# Patient Record
Sex: MALE | Race: WHITE | NOT HISPANIC OR LATINO | Employment: FULL TIME | URBAN - METROPOLITAN AREA
[De-identification: names, ages, dates, MRNs, and addresses within clinical notes are randomized per-mention and may not be internally consistent; named-entity substitution may affect disease eponyms.]

---

## 2019-02-21 RX ORDER — FLUTICASONE PROPIONATE 50 MCG
2 SPRAY, SUSPENSION (ML) NASAL
COMMUNITY

## 2019-02-21 RX ORDER — FEXOFENADINE HCL 180 MG/1
180 TABLET ORAL
COMMUNITY
Start: 2012-05-25 | End: 2019-02-22

## 2019-02-21 RX ORDER — PANTOPRAZOLE SODIUM 40 MG/1
40 TABLET, DELAYED RELEASE ORAL
COMMUNITY
Start: 2016-08-02 | End: 2019-02-22

## 2019-02-21 RX ORDER — MONTELUKAST SODIUM 10 MG/1
10 TABLET ORAL
COMMUNITY
End: 2019-02-22

## 2019-02-22 ENCOUNTER — OFFICE VISIT (OUTPATIENT)
Dept: FAMILY MEDICINE CLINIC | Facility: CLINIC | Age: 29
End: 2019-02-22

## 2019-02-22 VITALS
TEMPERATURE: 98.9 F | HEIGHT: 72 IN | BODY MASS INDEX: 42.66 KG/M2 | RESPIRATION RATE: 18 BRPM | WEIGHT: 315 LBS | OXYGEN SATURATION: 97 % | DIASTOLIC BLOOD PRESSURE: 90 MMHG | SYSTOLIC BLOOD PRESSURE: 150 MMHG | HEART RATE: 82 BPM

## 2019-02-22 DIAGNOSIS — Z76.89 ENCOUNTER TO ESTABLISH CARE: Primary | ICD-10-CM

## 2019-02-22 DIAGNOSIS — Z28.21 REFUSED INFLUENZA VACCINE: ICD-10-CM

## 2019-02-22 DIAGNOSIS — I10 ESSENTIAL HYPERTENSION: ICD-10-CM

## 2019-02-22 DIAGNOSIS — E66.01 CLASS 3 SEVERE OBESITY DUE TO EXCESS CALORIES WITH SERIOUS COMORBIDITY AND BODY MASS INDEX (BMI) OF 50.0 TO 59.9 IN ADULT (HCC): ICD-10-CM

## 2019-02-22 DIAGNOSIS — R94.31 ABNORMAL ECG: ICD-10-CM

## 2019-02-22 DIAGNOSIS — Z72.0 TOBACCO ABUSE: ICD-10-CM

## 2019-02-22 PROBLEM — K51.90 ULCERATIVE COLITIS (HCC): Status: ACTIVE | Noted: 2019-02-22

## 2019-02-22 PROCEDURE — 99204 OFFICE O/P NEW MOD 45 MIN: CPT | Performed by: NURSE PRACTITIONER

## 2019-02-22 NOTE — PROGRESS NOTES
FAMILY PRACTICE OFFICE VISIT       NAME: Izabella Perry  AGE: 33 y o  SEX: male       : 1990        MRN: [id number]    DATE: 2019    Assessment and Plan     Problem List Items Addressed This Visit        Other    Class 3 severe obesity due to excess calories with body mass index (BMI) of 50 0 to 59 9 in adult [facility])    Tobacco abuse      Other Visit Diagnoses     Encounter to establish care    -  Primary    Essential hypertension        Abnormal ECG        Relevant Orders    Ambulatory referral to Cardiology    Refused influenza vaccine        Relevant Orders    PREFERRED: influenza vaccine, [id number], quadrivalent, recombinant, PF, 0 5 mL (FLUBLOK)          1  Encounter to establish care  This 33year old male presents today to establish care  He is transitioning care from [facility] in order to be closer to home  Records in Epic reviewed  2  Essential hypertension  BP elevated 170/90, 150/90 on recheck  Records reveal he was taking HCTZ at one time  I would like him to return when he has insurance, over the next 1 month for BP check  Work on lifestyle modification, smoking cessation and weight loss  3  Abnormal ECG  He has been referred to our office by [facility] after completing work physical  His resting HR at physical was 120  In office today, initial HR check was 112  After sitting for a while, resting HR 82  In office ECG, with NSR, HR 90, normal axis, with nonspecific T wave changes  No acute ischemia  No comparison available  Based on abnormal ECG, and risk factors, morbid obesity, smoking, hypertension, recommend follow up with cardiology for cardiac clearance for employment  Name and number provided today  Reviewed plan of care and ECG with Dr Johnson Chaney  Ambulatory referral to Cardiology   4   Class 3 severe obesity due to excess calories with serious comorbidity and body mass index (BMI) of 50 0 to 59 9 in adult ([facility])  BMI Counseling: Body mass index is 55 29 kg/m²  Discussed the patient's BMI with him  The BMI is above average  BMI counseling and education was provided to the patient  Nutrition recommendations include reducing portion sizes, consuming healthier snacks, decreasing soda and/or juice intake, moderation in carbohydrate intake and increasing intake of lean protein  Exercise recommendations include exercising 3-5 times per week  Begin slowly with walking  5  Tobacco Abuse Tobacco Cessation Counseling: Tobacco cessation counseling and education was provided  The patient is sincerely urged to quit consumption of tobacco  He is not ready to quit tobacco  The numerous health risks of tobacco consumption were discussed  If he decides to quit, there are  anumber of helpful adjunctive aids, and he can see me to discuss nicotine replacement therapy, chantix, or bupropion anytime in the future  6  Refused influenza vaccine  PREFERRED: influenza vaccine, [id number], quadrivalent, recombinant, PF, 0 5 mL (FLUBLOK)           Chief Complaint     Chief Complaint   Patient presents with    Well Check     NP is here to est care  Check heart rate       History of Present Illness     Jumana Campbell is a 33year old male presenting today to establish care and for high heart rate  Prior PCP [address] [place] [address], last visit about 2 years ago  He has moved to this area in August and wants to establish care close to home  He is currently working as a  (night shift) in a temporary position  He has no health insurance in this position  He was offered a full time position  In order to get this position he is required to pass a physical exam for [employer]  At his physical exam, he was tachycardic, with resting   He was directed to follow up with PCP  He has a form with him today to release him to work  He reports his heart rate was checked after completing agility testing  History of ulcerative colitis   He does not follow up with gastroenterology  Has not had a flare up since 2008  Had pancreatitis once secondary to alcohol intake  States he rarely drinks any alcohol since then  Is fearful of alcohol consumption  Currently smoking  Had quit successfully cold turkey for about 3 years  Restarted with stressful events  Not ready to quit again  Family members tried Chantix and did not tolerate  He is reluctant to try Chantix or Zyban  Seasonal allergies uses Allegra and Flonase as needed  Does not have regular dental exams, is planning to go to the dentist when he gets insurance  Review of Systems   Review of Systems   Constitutional: Negative  HENT: Positive for hearing loss (left ear partially deaf since childhood)  Negative for congestion, dental problem, ear pain, postnasal drip, rhinorrhea, sinus pressure, sore throat, tinnitus, trouble swallowing and voice change  Eyes: Positive for visual disturbance (wears glasses)  Negative for photophobia, pain, discharge, redness and itching  Respiratory: Negative for cough, chest tightness, shortness of breath and wheezing  Cardiovascular: Negative for chest pain, palpitations and leg swelling  Gastrointestinal: Negative for abdominal pain, constipation, diarrhea, nausea and vomiting  Endocrine: Negative  Genitourinary: Negative  Musculoskeletal: Negative  Skin: Negative  Neurological: Negative for dizziness, seizures, syncope, light-headedness and headaches  Hematological: Does not bruise/bleed easily  Psychiatric/Behavioral: Negative  Active Problem List     Patient Active Problem List   Diagnosis    Ulcerative colitis ([facility] [address] )    Class 3 severe obesity due to excess calories with body mass index (BMI) of 50 0 to 59 9 in adult [facility])    Hearing loss in left ear    Tobacco abuse       Past Medical History:  No past medical history on file      Past Surgical History:  Past Surgical History:   Procedure Laterality Date    [facility]  2008    [facility]    MOUTH SURGERY         Family History:  Family History   Problem Relation Age of Onset    Hypertension Father     Diabetes Father     Meniere's disease Maternal Grandfather        Social History:  Social History     Socioeconomic History    Marital status: Single     Spouse name: Not on file    Number of children: Not on file    Years of education: Not on file    Highest education level: Not on file   Occupational History    Not on file   Social Needs    Financial resource strain: Not on file    Food insecurity:     Worry: Not on file     Inability: Not on file    Transportation needs:     Medical: Not on file     Non-medical: Not on file   Tobacco Use    Smoking status: Current Every Day Smoker     Packs/day: 0 50    Smokeless tobacco: Never Used   Substance and Sexual Activity    Alcohol use: Yes     Comment: occasionally    Drug use: Never    Sexual activity: Not on file   Lifestyle    Physical activity:     Days per week: Not on file     Minutes per session: Not on file    Stress: Not on file   Relationships    Social connections:     Talks on phone: Not on file     Gets together: Not on file     Attends Religion service: Not on file     Active member of club or organization: Not on file     Attends meetings of clubs or organizations: Not on file     Relationship status: Not on file    Intimate partner violence:     Fear of current or ex partner: Not on file     Emotionally abused: Not on file     Physically abused: Not on file     Forced sexual activity: Not on file   Other Topics Concern    Not on file   Social History Narrative    Not on file       I have reviewed the patient's medical history in detail; there are no changes to the history as noted in the electronic medical record      Objective     Vitals:    02/22/19 0728 02/22/19 0815   BP: 170/90 150/90   Pulse: (!) 112 82   Resp: 18    Temp: 98 9 °F (37 2 °C)    TempSrc: Tympanic SpO2:  97%   Weight: (!) 182 kg (402 lb)    Height: 5' 11 5" (1 816 m)      Wt Readings from Last 3 Encounters:   02/22/19 (!) 182 kg (402 lb)     Body mass index is 55 29 kg/m²  PHQ-9 Depression Screening    PHQ-9:    Frequency of the following problems over the past two weeks:       Little interest or pleasure in doing things:  0 - not at all  Feeling down, depressed, or hopeless:  0 - not at all  PHQ-2 Score:  0       Physical Exam   Constitutional: He is oriented to person, place, and time  He appears well-developed and well-nourished  No distress  HENT:   Head: Normocephalic and atraumatic  Right Ear: Tympanic membrane and ear canal normal    Left Ear: Tympanic membrane and ear canal normal    Nose: Nose normal    Mouth/Throat: Oropharynx is clear and moist    Eyes: Pupils are equal, round, and reactive to light  Conjunctivae and EOM are normal    Neck: Normal range of motion  Neck supple  No thyromegaly present  Cardiovascular: Normal rate, regular rhythm and normal heart sounds  Pulmonary/Chest: Effort normal and breath sounds normal  No respiratory distress  Abdominal: Soft  Bowel sounds are normal  There is no tenderness  obese   Musculoskeletal: Normal range of motion  He exhibits no edema  Lymphadenopathy:     He has no cervical adenopathy  Neurological: He is alert and oriented to person, place, and time  Skin: Skin is warm and dry  No rash noted  Psychiatric: He has a normal mood and affect  Nursing note and vitals reviewed  ALLERGIES:  Allergies   Allergen Reactions    Morphine Anaphylaxis     Anaphylaxis    Guaifenesin Hives    Penicillins Rash    Sulfa Antibiotics Rash       Current Medications     Current Outpatient Medications   Medication Sig Dispense Refill    fexofenadine (ALLEGRA) 180 MG tablet Take by mouth      fluticasone (FLONASE) 50 mcg/act nasal spray 2 sprays into each nostril       No current facility-administered medications for this visit  Health Maintenance     Health Maintenance   Topic Date Due    BMI: Followup Plan  12/24/2009    Pneumococcal PPSV23 Medium Risk Adult (1 of 1 - PPSV23) 12/24/2010    DTaP,Tdap,and Td Vaccines (1 - Tdap) 12/24/2012    INFLUENZA VACCINE  02/22/2020 (Originally 7/1/2018)    Depression Screening PHQ  02/22/2020    BMI: Adult  02/22/2020    HEPATITIS B VACCINES  Aged Out     Immunization History   Administered Date(s) Administered    INFLUENZA 02/04/2013       TONIE [place]

## 2019-02-24 PROBLEM — Z72.0 TOBACCO ABUSE: Status: ACTIVE | Noted: 2019-02-24

## 2019-02-24 PROBLEM — H91.92 HEARING LOSS IN LEFT EAR: Status: ACTIVE | Noted: 2019-02-24

## 2019-03-07 ENCOUNTER — HOSPITAL ENCOUNTER (OUTPATIENT)
Dept: NON INVASIVE DIAGNOSTICS | Facility: CLINIC | Age: 29
Discharge: HOME | End: 2019-03-07

## 2019-03-07 ENCOUNTER — TELEPHONE (OUTPATIENT)
Dept: CARDIOLOGY CLINIC | Facility: CLINIC | Age: 29
End: 2019-03-07

## 2019-03-07 ENCOUNTER — CONSULT (OUTPATIENT)
Dept: CARDIOLOGY CLINIC | Facility: CLINIC | Age: 29
End: 2019-03-07

## 2019-03-07 VITALS
WEIGHT: 315 LBS | DIASTOLIC BLOOD PRESSURE: 90 MMHG | BODY MASS INDEX: 44.1 KG/M2 | SYSTOLIC BLOOD PRESSURE: 166 MMHG | HEART RATE: 90 BPM | HEIGHT: 71 IN

## 2019-03-07 DIAGNOSIS — R94.31 ABNORMAL ECG: ICD-10-CM

## 2019-03-07 DIAGNOSIS — Z72.0 TOBACCO ABUSE: ICD-10-CM

## 2019-03-07 DIAGNOSIS — Z72.0 TOBACCO ABUSE: Primary | ICD-10-CM

## 2019-03-07 DIAGNOSIS — I10 ESSENTIAL (PRIMARY) HYPERTENSION: ICD-10-CM

## 2019-03-07 DIAGNOSIS — R94.31 ABNORMAL EKG: ICD-10-CM

## 2019-03-07 PROCEDURE — 93000 ELECTROCARDIOGRAM COMPLETE: CPT | Performed by: INTERNAL MEDICINE

## 2019-03-07 PROCEDURE — 99244 OFF/OP CNSLTJ NEW/EST MOD 40: CPT | Performed by: INTERNAL MEDICINE

## 2019-03-07 PROCEDURE — 93306 TTE W/DOPPLER COMPLETE: CPT | Performed by: INTERNAL MEDICINE

## 2019-03-07 PROCEDURE — 93306 TTE W/DOPPLER COMPLETE: CPT

## 2019-03-07 RX ORDER — LOSARTAN POTASSIUM 100 MG/1
100 TABLET ORAL DAILY
Qty: 90 TABLET | Refills: 3 | Status: SHIPPED | OUTPATIENT
Start: 2019-03-07 | End: 2019-06-17 | Stop reason: ALTCHOICE

## 2019-03-07 NOTE — TELEPHONE ENCOUNTER
Echo shows good heart function  He has mild LVH but I started him today on blood pressure medication  I sent a note to Alek Wyatt and you can check with her about this  He is cleared to go to work tomorrow  If he needs a note just forwarded to me and I will sign it  Thank you

## 2019-03-07 NOTE — PATIENT INSTRUCTIONS
I will start losartan 100 mg per day  Have your blood pressure checked in about 2-3 weeks and call in the numbers to me  I will arrange an echocardiogram to check your heart function  Once I have this information I will hopefully be able to clear you for your employment  Call in the interim if there is a problem otherwise I will see you in six months time

## 2019-03-07 NOTE — PROGRESS NOTES
Cardiology Consultation     Kaleb Beyer  [id number]  1990  [facility] [address]  1  Tobacco abuse     2  Essential (primary) hypertension     3  Abnormal EKG  POCT ECG   4  Abnormal ECG  Ambulatory referral to Cardiology     Patient Active Problem List   Diagnosis    Ulcerative colitis ([facility] [address] )    Class 3 severe obesity due to excess calories with body mass index (BMI) of 50 0 to 59 9 in adult [facility])    Hearing loss in left ear    Tobacco abuse       HPI patient is here for a cardiology evaluation  He has a history of morbid obesity  He apparently had had an electrocardiogram done which was felt to be abnormal and is referred here for an assessment  He needs clearance to start a new job  Patient does have essential hypertension  He is well smoke cigarettes  He has cut back but still smokes 10 cigarettes per day  He was having a physical prior to getting his job as a  and he developed an increased heart rate  Had an EKG done in his primary care physician's office which was felt to be nonspecifically abnormal and is here today for evaluation  His EKG today as well demonstrates sinus rhythm with nonspecific T-wave changes  An EKG done at [facility] in 2016 as well demonstrated nonspecific T-wave changes  He has had no chest pain or significant dyspnea  He is here for a cardiac evaluation prior to returning to work as a   He tells me that he has a family history of heart disease in reference to his father who has hypertension and a heart murmur  Apparently his paternal grandmother as well has a heart murmur  PMH-  No past medical history on file       SOCIAL HISTORY-  Social History     Socioeconomic History    Marital status: Single     Spouse name: Not on file    Number of children: Not on file    Years of education: Not on file    Highest education level: Not on file   Occupational History    Not on file   Social Needs    Financial resource strain: Not on file    Food insecurity:     Worry: Not on file     Inability: Not on file    Transportation needs:     Medical: Not on file     Non-medical: Not on file   Tobacco Use    Smoking status: Current Every Day Smoker     Packs/day: 0 50    Smokeless tobacco: Never Used   Substance and Sexual Activity    Alcohol use: Yes     Comment: occasionally    Drug use: Never    Sexual activity: Not on file   Lifestyle    Physical activity:     Days per week: Not on file     Minutes per session: Not on file    Stress: Not on file   Relationships    Social connections:     Talks on phone: Not on file     Gets together: Not on file     Attends Druze service: Not on file     Active member of club or organization: Not on file     Attends meetings of clubs or organizations: Not on file     Relationship status: Not on file    Intimate partner violence:     Fear of current or ex partner: Not on file     Emotionally abused: Not on file     Physically abused: Not on file     Forced sexual activity: Not on file   Other Topics Concern    Not on file   Social History Narrative    Not on file        FAMILY HISTORY-  Family History   Problem Relation Age of Onset    Hypertension Father     Diabetes Father     Meniere's disease Maternal Grandfather        SURGICAL HISTORY-  Past Surgical History:   Procedure Laterality Date    COLONOSCOPY  2008    [facility]    MOUTH SURGERY           Current Outpatient Medications:     fexofenadine (ALLEGRA) 180 MG tablet, Take by mouth, Disp: , Rfl:     fluticasone (FLONASE) 50 mcg/act nasal spray, 2 sprays into each nostril, Disp: , Rfl:   Allergies   Allergen Reactions    Morphine Anaphylaxis     Anaphylaxis    Guaifenesin Hives    Penicillins Rash    Sulfa Antibiotics Rash     Vitals:    03/07/19 1024   BP: 166/90   BP Location: Right arm Patient Position: Sitting   Cuff Size: Extra-Large   Pulse: 90   Weight: (!) 182 kg (401 lb)   Height: 5' 11" (1 803 m)         Review of Systems:  Review of Systems   All other systems reviewed and are negative  Physical Exam:  Physical Exam   Constitutional: He is oriented to person, place, and time  He appears well-developed and well-nourished  HENT:   Head: Normocephalic and atraumatic  Eyes: Pupils are equal, round, and reactive to light  Conjunctivae are normal    Neck: Normal range of motion  Neck supple  Cardiovascular: Normal rate and normal heart sounds  Pulmonary/Chest: Effort normal and breath sounds normal    Neurological: He is alert and oriented to person, place, and time  Skin: Skin is warm and dry  Psychiatric: He has a normal mood and affect  Vitals reviewed  Discussion/Summary:  I will start losartan 100 mg per day for his blood pressure  We will recheck his blood pressure in about two weeks time  I will check an echocardiogram to assess LV wall thickness and systolic function  I did tell him that once I obtain the result of the echocardiogram that I would clear him to return to work  I have asked him to call in general if there is a problem in the interim otherwise I will see him in six months time  We did discuss the importance of tobacco cessation  Is slowly cutting down on the number of cigarettes that he uses

## 2019-06-17 ENCOUNTER — TELEPHONE (OUTPATIENT)
Dept: CARDIOLOGY CLINIC | Facility: CLINIC | Age: 29
End: 2019-06-17

## 2019-06-17 DIAGNOSIS — I10 ESSENTIAL (PRIMARY) HYPERTENSION: Primary | ICD-10-CM

## 2019-06-17 RX ORDER — AMLODIPINE BESYLATE 5 MG/1
5 TABLET ORAL DAILY
Qty: 30 TABLET | Refills: 6 | Status: SHIPPED | OUTPATIENT
Start: 2019-06-17 | End: 2019-07-05 | Stop reason: ALTCHOICE

## 2019-07-05 ENCOUNTER — OFFICE VISIT (OUTPATIENT)
Dept: FAMILY MEDICINE CLINIC | Facility: CLINIC | Age: 29
End: 2019-07-05
Payer: COMMERCIAL

## 2019-07-05 VITALS
TEMPERATURE: 100.1 F | HEIGHT: 71 IN | BODY MASS INDEX: 44.1 KG/M2 | OXYGEN SATURATION: 96 % | WEIGHT: 315 LBS | HEART RATE: 100 BPM | DIASTOLIC BLOOD PRESSURE: 90 MMHG | SYSTOLIC BLOOD PRESSURE: 142 MMHG | RESPIRATION RATE: 20 BRPM

## 2019-07-05 DIAGNOSIS — I10 HYPERTENSION, UNSPECIFIED TYPE: ICD-10-CM

## 2019-07-05 DIAGNOSIS — Z00.00 WELL ADULT EXAM: Primary | ICD-10-CM

## 2019-07-05 DIAGNOSIS — E11.9 NEW ONSET TYPE 2 DIABETES MELLITUS (HCC): ICD-10-CM

## 2019-07-05 DIAGNOSIS — E66.01 CLASS 3 SEVERE OBESITY DUE TO EXCESS CALORIES WITH SERIOUS COMORBIDITY AND BODY MASS INDEX (BMI) OF 50.0 TO 59.9 IN ADULT (HCC): ICD-10-CM

## 2019-07-05 LAB
SL AMB  POCT GLUCOSE, UA: ABNORMAL
SL AMB LEUKOCYTE ESTERASE,UA: ABNORMAL
SL AMB POCT BILIRUBIN,UA: ABNORMAL
SL AMB POCT BLOOD,UA: ABNORMAL
SL AMB POCT CLARITY,UA: CLEAR
SL AMB POCT COLOR,UA: YELLOW
SL AMB POCT KETONES,UA: ABNORMAL
SL AMB POCT NITRITE,UA: ABNORMAL
SL AMB POCT PH,UA: 5
SL AMB POCT SPECIFIC GRAVITY,UA: 1.02
SL AMB POCT URINE PROTEIN: ABNORMAL
SL AMB POCT UROBILINOGEN: 0.2

## 2019-07-05 PROCEDURE — 81003 URINALYSIS AUTO W/O SCOPE: CPT | Performed by: NURSE PRACTITIONER

## 2019-07-05 PROCEDURE — 99395 PREV VISIT EST AGE 18-39: CPT | Performed by: NURSE PRACTITIONER

## 2019-07-05 RX ORDER — AMLODIPINE BESYLATE 10 MG/1
10 TABLET ORAL DAILY
Qty: 90 TABLET | Refills: 1 | Status: SHIPPED | OUTPATIENT
Start: 2019-07-05 | End: 2020-10-08

## 2019-07-05 NOTE — PROGRESS NOTES
FAMILY PRACTICE OFFICE VISIT       NAME: Sarah Perry  AGE: 33 y o  SEX: male       : 1990        MRN: [id number]    DATE: 2019    Assessment and Plan     Problem List Items Addressed This Visit        Other    Class 3 severe obesity due to excess calories with body mass index (BMI) of 50 0 to 59 9 in adult [facility])      Other Visit Diagnoses     Well adult exam    -  Primary    Relevant Orders    POCT urine dip auto non-scope (Completed)    New onset type 2 diabetes mellitus ([facility] [address] )        Relevant Medications    metFORMIN (GLUCOPHAGE) 500 mg tablet    Other Relevant Orders    Ambulatory referral to medical nutrition therapy for diabetes    Ambulatory referral to Endocrinology    Ambulatory referral to Ophthalmology    Hypertension, unspecified type        Relevant Medications    amLODIPine (NORVASC) 10 mg tablet        1  Well adult exam  In office urine dip is positive for glucosuria  POCT hemoglobin A1c checked, 12%  New diagnosis of diabetes today  Suspect type 2 diabetes due to lifestyle  POCT urine dip auto non-scope   2  New onset type 2 diabetes mellitus ([facility] [address] )  Will begin Metformin 500 mg BID for 1 week, then increase to 1000 mg in the am, and 500 mg in the pm for 1 week, then increase to 1000 mg twice daily  Reviewed side effect profile of metformin  He does have a history of pancreatitis, which affects medication choices  Discussed impact of lifestyle choices on disease process  Recommend eating diabetic diet, exercising, and weight loss  Will refer to endocrinology and medical nutrition therapy  Recommend regular exercise  Had an eye exam 2-3 months ago  Discussed he will need another eye exam specific for diabetes, checking for retinopathy  Reviewed risks of uncontrolled diabetes including retinopathy, nephropathy, neuropathy, heart attack, and stroke  Reviewed importance of daily visual foot exams and taking good care of feet  Return to office for follow up in 1 month   Will do foot exam and urine for microalbumin at this visit  Recommend follow up with endocrinology  Ambulatory referral to medical nutrition therapy for diabetes    Ambulatory referral to Endocrinology    Ambulatory referral to Ophthalmology    metFORMIN (GLUCOPHAGE) 500 mg tablet   3  Hypertension, unspecified type  BP elevated in office today, will increase amlodipine to 10 mg daily  Goal <130/80  Will follow up in 1 month  amLODIPine (NORVASC) 10 mg tablet   4  Class 3 severe obesity due to excess calories with serious comorbidity and body mass index (BMI) of 50 0 to 59 9 in adult ([facility])  BMI Counseling: Body mass index is 53 39 kg/m²  Discussed the patient's BMI with him  The BMI is above average  BMI counseling and education was provided to the patient  Nutrition recommendations include reducing portion sizes, decreasing overall calorie intake, 3-5 servings of fruits/vegetables daily, decreasing soda and/or juice intake, moderation in carbohydrate intake and increasing intake of lean protein  Exercise recommendations include moderate aerobic physical activity for 150 minutes/week  Congratulated on recent weight loss of 20 pounds  Chief Complaint     Chief Complaint   Patient presents with    Well Check       History of Present Illness     Bob Rice is a 33year old male presenting today for physical exam     He feels thirsty and has been voiding frequently recently  Otherwise feels well  Has lost approximately 20 lb over the past 5 months her dietary changes  Cut back on soda  Stops drinking whole milk unchanged disc given milk  Cut down on portion sizes  Trying to make better choices  He has not been exercising except for walking a lot at work  Review of Systems   Review of Systems   Constitutional: Negative for activity change, appetite change, chills, diaphoresis, fatigue, fever and unexpected weight change  HENT: Negative  Eyes: Negative  Respiratory: Negative  Cardiovascular: Negative  Gastrointestinal: Negative  Endocrine: Positive for polydipsia and polyuria  Genitourinary: Negative  Musculoskeletal: Negative  Skin: Negative  Neurological: Negative  Hematological: Negative  Psychiatric/Behavioral: Negative          Active Problem List     Patient Active Problem List   Diagnosis    Ulcerative colitis ([facility] [address] )    Class 3 severe obesity due to excess calories with body mass index (BMI) of 50 0 to 59 9 in adult [facility])    Hearing loss in left ear    Tobacco abuse       Past Medical History:  Past Medical History:   Diagnosis Date    Blood in stool     Cough with sputum     Problems with hearing     Wheezing        Past Surgical History:  Past Surgical History:   Procedure Laterality Date    COLONOSCOPY  2008    [facility]    MOUTH SURGERY         Family History:  Family History   Problem Relation Age of Onset    Hypertension Father     Diabetes Father     Meniere's disease Maternal Grandfather        Social History:  Social History     Socioeconomic History    Marital status: Single     Spouse name: Not on file    Number of children: Not on file    Years of education: Not on file    Highest education level: Not on file   Occupational History    Not on file   Social Needs    Financial resource strain: Not on file    Food insecurity:     Worry: Not on file     Inability: Not on file    Transportation needs:     Medical: Not on file     Non-medical: Not on file   Tobacco Use    Smoking status: Current Every Day Smoker     Packs/day: 0 50    Smokeless tobacco: Never Used   Substance and Sexual Activity    Alcohol use: Yes     Comment: occasionally    Drug use: Never    Sexual activity: Not on file   Lifestyle    Physical activity:     Days per week: Not on file     Minutes per session: Not on file    Stress: Not on file   Relationships    Social connections:     Talks on phone: Not on file     Gets together: Not on file     Attends Catholic service: Not on file     Active member of club or organization: Not on file     Attends meetings of clubs or organizations: Not on file     Relationship status: Not on file    Intimate partner violence:     Fear of current or ex partner: Not on file     Emotionally abused: Not on file     Physically abused: Not on file     Forced sexual activity: Not on file   Other Topics Concern    Not on file   Social History Narrative    Not on file       I have reviewed the patient's medical history in detail; there are no changes to the history as noted in the electronic medical record  Objective     Vitals:    07/05/19 0731   BP: 142/90   BP Location: Left arm   Patient Position: Sitting   Cuff Size: Large   Pulse: 100   Resp: 20   Temp: 100 1 °F (37 8 °C)   TempSrc: Tympanic   SpO2: 96%   Weight: (!) 174 kg (382 lb 12 8 oz)   Height: 5' 11" (1 803 m)     Wt Readings from Last 3 Encounters:   07/05/19 (!) 174 kg (382 lb 12 8 oz)   03/07/19 (!) 182 kg (401 lb)   02/22/19 (!) 182 kg (402 lb)     Body mass index is 53 39 kg/m²  PHQ-9 Depression Screening    PHQ-9:    Frequency of the following problems over the past two weeks:            Physical Exam   Constitutional: He is oriented to person, place, and time  He appears well-developed and well-nourished  No distress  HENT:   Head: Normocephalic and atraumatic  Right Ear: Tympanic membrane and ear canal normal    Left Ear: Tympanic membrane and ear canal normal    Nose: Nose normal    Mouth/Throat: Oropharynx is clear and moist    Eyes: Pupils are equal, round, and reactive to light  Conjunctivae and EOM are normal    Neck: Normal range of motion  Neck supple  No thyromegaly present  Cardiovascular: Normal rate and regular rhythm  No murmur heard  Pulmonary/Chest: Effort normal and breath sounds normal    Abdominal: Soft  Bowel sounds are normal    Abdomen obese   Musculoskeletal: Normal range of motion  He exhibits no edema  Lymphadenopathy:     He has no cervical adenopathy  Neurological: He is alert and oriented to person, place, and time  Skin: No rash noted  Psychiatric: He has a normal mood and affect  Nursing note and vitals reviewed  ALLERGIES:  Allergies   Allergen Reactions    Morphine Anaphylaxis     Anaphylaxis    Guaifenesin Hives    Losartan Abdominal Pain     Muscle cramps    Penicillins Rash    Sulfa Antibiotics Rash       Current Medications     Current Outpatient Medications   Medication Sig Dispense Refill    fexofenadine (ALLEGRA) 180 MG tablet Take 180 mg by mouth daily       fluticasone (FLONASE) 50 mcg/act nasal spray 2 sprays into each nostril      amLODIPine (NORVASC) 10 mg tablet Take 1 tablet (10 mg total) by mouth daily 90 tablet 1    metFORMIN (GLUCOPHAGE) 500 mg tablet Take 2 tablets (1,000 mg total) by mouth 2 (two) times a day with meals 120 tablet 0     No current facility-administered medications for this visit            Health Maintenance     Health Maintenance   Topic Date Due    Pneumococcal Vaccine: Pediatrics (0 to 5 Years) and At-Risk Patients (6 to 59 Years) (1 of 1 - PPSV23) 12/24/1997    HEPATITIS B VACCINES (1 of 3 - Risk 3-dose series) 12/24/2010    DTaP,Tdap,and Td Vaccines (1 - Tdap) 12/24/2012    INFLUENZA VACCINE  02/22/2020 (Originally 7/1/2019)    Depression Screening PHQ  02/22/2020    BMI: Followup Plan  02/24/2020    BMI: Adult  07/05/2020    Pneumococcal Vaccine: 65+ Years (1 of 2 - PCV13) 12/24/2056     Immunization History   Administered Date(s) Administered    INFLUENZA 02/04/2013       TONIE Hackett

## 2019-08-05 ENCOUNTER — OFFICE VISIT (OUTPATIENT)
Dept: FAMILY MEDICINE CLINIC | Facility: CLINIC | Age: 29
End: 2019-08-05
Payer: COMMERCIAL

## 2019-08-05 VITALS
HEIGHT: 71 IN | TEMPERATURE: 99.7 F | SYSTOLIC BLOOD PRESSURE: 130 MMHG | RESPIRATION RATE: 18 BRPM | OXYGEN SATURATION: 98 % | WEIGHT: 315 LBS | BODY MASS INDEX: 44.1 KG/M2 | HEART RATE: 102 BPM | DIASTOLIC BLOOD PRESSURE: 98 MMHG

## 2019-08-05 DIAGNOSIS — E66.01 CLASS 3 SEVERE OBESITY DUE TO EXCESS CALORIES WITH SERIOUS COMORBIDITY AND BODY MASS INDEX (BMI) OF 50.0 TO 59.9 IN ADULT (HCC): ICD-10-CM

## 2019-08-05 DIAGNOSIS — Z72.0 TOBACCO ABUSE: ICD-10-CM

## 2019-08-05 DIAGNOSIS — I10 ESSENTIAL HYPERTENSION: ICD-10-CM

## 2019-08-05 DIAGNOSIS — E11.65 UNCONTROLLED TYPE 2 DIABETES MELLITUS WITH HYPERGLYCEMIA (HCC): Primary | ICD-10-CM

## 2019-08-05 LAB — SL AMB POCT HEMOGLOBIN AIC: 8.8 (ref ?–6.5)

## 2019-08-05 PROCEDURE — 3008F BODY MASS INDEX DOCD: CPT | Performed by: NURSE PRACTITIONER

## 2019-08-05 PROCEDURE — 82043 UR ALBUMIN QUANTITATIVE: CPT | Performed by: NURSE PRACTITIONER

## 2019-08-05 PROCEDURE — 82570 ASSAY OF URINE CREATININE: CPT | Performed by: NURSE PRACTITIONER

## 2019-08-05 PROCEDURE — 83036 HEMOGLOBIN GLYCOSYLATED A1C: CPT | Performed by: NURSE PRACTITIONER

## 2019-08-05 PROCEDURE — 99214 OFFICE O/P EST MOD 30 MIN: CPT | Performed by: NURSE PRACTITIONER

## 2019-08-05 PROCEDURE — 3045F PR MOST RECENT HEMOGLOBIN A1C LEVEL 7.0-9.0%: CPT | Performed by: NURSE PRACTITIONER

## 2019-08-05 NOTE — PROGRESS NOTES
FAMILY PRACTICE OFFICE VISIT       NAME: Kelvin Perry  AGE: 33 y o  SEX: male       : 1990        MRN: [id number]    DATE: 2019    Assessment and Plan     Problem List Items Addressed This Visit        Endocrine    Uncontrolled type 2 diabetes mellitus with hyperglycemia ([address] ) - Primary    Relevant Medications    metFORMIN (GLUCOPHAGE) 500 mg tablet    Other Relevant Orders    Glucometer    Glucometer test strips    Lancets    Microalbumin / creatinine urine ratio (Completed)    POCT hemoglobin A1c (Completed)       Cardiovascular and Mediastinum    Essential hypertension    Relevant Medications    metFORMIN (GLUCOPHAGE) 500 mg tablet    Other Relevant Orders    Microalbumin / creatinine urine ratio (Completed)    POCT hemoglobin A1c (Completed)       Other    Class 3 severe obesity due to excess calories with body mass index (BMI) of 50 0 to 59 9 in adult [facility])    Tobacco abuse        1  Uncontrolled type 2 diabetes mellitus with hyperglycemia [facility])  This 42-year-old male presents today for follow-up on uncontrolled diabetes  A1c at last office visit was 12% on   Over the past 1 month he has made dietary changes, is continuing to work on weight loss  He is taking metformin 1000 mg twice daily  Reached max dose 2 weeks ago  Today A1c is down to 8 8%  This is significant improvement, patient is congratulated on his efforts  He understands although improved, A1c is not yet at goal   I am hesitant to add another medication at this point due to significant improvement in 1 month with metformin and lifestyle changes  He also has a history of pancreatitis, which impacts options for further agents  He will attend diabetic nutrition class, will call to schedule today  He will be seeing Endocrinology in September, Dr Estefani Winston  Will continue with lifestyle modifications  I would like him to check his fasting blood glucose daily    He works night shift, so most of the time this will be an afternoon glucose  He will record blood glucoses, and bring them to his endocrinology appointment in September  Will check urine for microalbumin today  He was on losartan therapy in the past and developed significant muscle cramps  May consider adding low-dose lisinopril in the future for renal protection  Aware he needs eye exam for diabetic retinopathy check  Foot exam was completed today  He will complete previously ordered blood work including CBC, CMP, TSH and lipid panel  He has a strong family history of diabetes, and is aware of long-term risks of uncontrolled blood glucoses  Glucometer    Glucometer test strips    Lancets    Microalbumin / creatinine urine ratio    POCT hemoglobin A1c   2  Class 3 severe obesity due to excess calories with serious comorbidity and body mass index (BMI) of 50 0 to 59 9 in [facility])  Actively working on weight loss  Lost 5 lb in the past 1 month, and has lost 35 lb over the past 6 months  He has done most of this through dietary changes  Continue to encourage increasing activity level, and regular exercise  3  Tobacco abuse  Tobacco Cessation Counseling: Tobacco cessation counseling and education was provided  The patient is sincerely urged to quit consumption of tobacco  He is not ready to quit tobacco  The numerous health risks of tobacco consumption were discussed  If he decides to quit, there are  anumber of helpful adjunctive aids, and he can see me to discuss nicotine replacement therapy, chantix, or bupropion anytime in the future  4  Hypertension Blood pressure is borderline today 132/86, 130/98  Will continue amlodipine 10 mg daily  He will continue with lifestyle modifications  He will complete routine blood work as previously ordered  He will follow up in 3 months    metFORMIN (GLUCOPHAGE) 500 mg tablet    Microalbumin / creatinine urine ratio    POCT hemoglobin A1c           Chief Complaint     Chief Complaint   Patient presents with   Kathleen Schultz Follow-up       History of Present Illness     Micheal Lunsford is a 43-year-old male presenting today for follow-up visit  Newly diagnosed with diabetes 1 month ago at office visit with POCT A1c 12%  Since last visit he has lost 5 lb  Has actively been working on weight loss, and has lost 35 lb over the past 6 months  Has eliminated sugary drinks from his diet, tries to stick with water, and occasionally has a diet soda  Changed from white bread to whole wheat bread  Also cut back on portions  He is taking metformin 1000 mg twice daily, just reached max dose 2 weeks ago  Tolerating this medication without difficulty  Has not scheduled diabetic nutrition education class yet, although plans to call today  Is scheduled with Endocrinology in September  Also scheduled to follow up with Cardiology in September  He unfortunately is still smoking, would like to quit  However, is currently focusing on dietary changes and weight loss  Review of Systems   Review of Systems   Constitutional: Negative  Respiratory: Negative  Cardiovascular: Negative  Gastrointestinal: Negative  Genitourinary: Negative  Musculoskeletal: Negative  Skin: Negative  Neurological: Negative          Active Problem List     Patient Active Problem List   Diagnosis    Ulcerative colitis ([facility] [address] )    Class 3 severe obesity due to excess calories with body mass index (BMI) of 50 0 to 59 9 in adult [facility])    Hearing loss in left ear    Tobacco abuse    Uncontrolled type 2 diabetes mellitus with hyperglycemia ([facility] [address] )    Essential hypertension       Past Medical History:  Past Medical History:   Diagnosis Date    Blood in stool     Cough with sputum     Pancreatitis     Problems with hearing     Wheezing        Past Surgical History:  Past Surgical History:   Procedure Laterality Date    [facility]    MOUTH SURGERY         Family History:  Family History   Problem Relation Age of Onset    Hypertension Father     Diabetes Father     Meniere's disease Maternal Grandfather        Social History:  Social History     Socioeconomic History    Marital status: Single     Spouse name: Not on file    Number of children: Not on file    Years of education: Not on file    Highest education level: Not on file   Occupational History    Not on file   Social Needs    Financial resource strain: Not on file    Food insecurity:     Worry: Not on file     Inability: Not on file    Transportation needs:     Medical: Not on file     Non-medical: Not on file   Tobacco Use    Smoking status: Current Every Day Smoker     Packs/day: 0 50    Smokeless tobacco: Never Used   Substance and Sexual Activity    Alcohol use: Yes     Comment: occasionally    Drug use: Never    Sexual activity: Not on file   Lifestyle    Physical activity:     Days per week: Not on file     Minutes per session: Not on file    Stress: Not on file   Relationships    Social connections:     Talks on phone: Not on file     Gets together: Not on file     Attends Rastafari service: Not on file     Active member of club or organization: Not on file     Attends meetings of clubs or organizations: Not on file     Relationship status: Not on file    Intimate partner violence:     Fear of current or ex partner: Not on file     Emotionally abused: Not on file     Physically abused: Not on file     Forced sexual activity: Not on file   Other Topics Concern    Not on file   Social History Narrative    Not on file       I have reviewed the patient's medical history in detail; there are no changes to the history as noted in the electronic medical record      Objective     Vitals:    08/05/19 0904 08/05/19 0948   BP: 132/86 130/98   BP Location: Left arm    Patient Position: Sitting    Cuff Size: Large    Pulse: 102    Resp: 18    Temp: 99 7 °F (37 6 °C)    TempSrc: Tympanic    SpO2: 98%    Weight: (!) 171 kg (377 lb 9 6 oz)    Height: 5' 11" (1 803 m)      Wt Readings from Last 3 Encounters:   08/05/19 (!) 171 kg (377 lb 9 6 oz)   07/05/19 (!) 174 kg (382 lb 12 8 oz)   03/07/19 (!) 182 kg (401 lb)     Body mass index is 52 66 kg/m²  PHQ-9 Depression Screening    PHQ-9:    Frequency of the following problems over the past two weeks:            Physical Exam   Constitutional: He appears well-developed and well-nourished  No distress  Morbidly obese   Cardiovascular: Normal rate, regular rhythm and normal heart sounds  Pulses are no weak pulses  No murmur heard  Pulses:       Dorsalis pedis pulses are 2+ on the right side, and 2+ on the left side  Posterior tibial pulses are 2+ on the right side, and 2+ on the left side  Pulmonary/Chest: Effort normal and breath sounds normal    Feet:   Right Foot:   Skin Integrity: Negative for ulcer, skin breakdown, erythema, warmth, callus or dry skin  Left Foot:   Skin Integrity: Negative for ulcer, skin breakdown, erythema, warmth, callus or dry skin  Psychiatric: He has a normal mood and affect  Nursing note and vitals reviewed  Patient's shoes and socks removed  Right Foot/Ankle   Right Foot Inspection  Skin Exam: skin normal and skin intact no dry skin, no warmth, no callus, no erythema, no maceration, no abnormal color, no pre-ulcer, no ulcer and no callus                          Toe Exam: ROM and strength within normal limits  Sensory     Proprioception: intact   Monofilament testing: intact  Vascular  Capillary refills: < 3 seconds  The right DP pulse is 2+  The right PT pulse is 2+  Left Foot/Ankle  Left Foot Inspection  Skin Exam: skin normal and skin intactno dry skin, no warmth, no erythema, no maceration, normal color, no pre-ulcer, no ulcer and no callus                         Toe Exam: ROM and strength within normal limits                   Sensory     Proprioception: intact  Monofilament: intact  Vascular  Capillary refills: < 3 seconds  The left DP pulse is 2+   The left PT pulse is 2+  Assign Risk Category:  No deformity present; No loss of protective sensation; No weak pulses       Risk: 0    ALLERGIES:  Allergies   Allergen Reactions    Morphine Anaphylaxis     Anaphylaxis    Guaifenesin Hives    Losartan Abdominal Pain     Muscle cramps    Penicillins Rash    Sulfa Antibiotics Rash       Current Medications     Current Outpatient Medications   Medication Sig Dispense Refill    amLODIPine (NORVASC) 10 mg tablet Take 1 tablet (10 mg total) by mouth daily 90 tablet 1    fexofenadine (ALLEGRA) 180 MG tablet Take 180 mg by mouth daily       fluticasone (FLONASE) 50 mcg/act nasal spray 2 sprays into each nostril      metFORMIN (GLUCOPHAGE) 500 mg tablet Take 2 tablets (1,000 mg total) by mouth 2 (two) times a day with meals 360 tablet 1     No current facility-administered medications for this visit            Health Maintenance     Health Maintenance   Topic Date Due    Pneumococcal Vaccine: Pediatrics (0 to 5 Years) and At-Risk Patients (6 to 59 Years) (1 of 1 - PPSV23) 12/24/1997    Diabetic Foot Exam  12/24/2001    DM Eye Exam  12/24/2001    HEPATITIS B VACCINES (1 of 3 - Risk 3-dose series) 12/24/2010    DTaP,Tdap,and Td Vaccines (1 - Tdap) 12/24/2012    INFLUENZA VACCINE  02/22/2020 (Originally 7/1/2019)    HEMOGLOBIN A1C  02/05/2020    Depression Screening PHQ  02/22/2020    BMI: Followup Plan  07/07/2020    BMI: Adult  08/05/2020    URINE MICROALBUMIN  08/05/2020    Pneumococcal Vaccine: 65+ Years (1 of 2 - PCV13) 12/24/2056     Immunization History   Administered Date(s) Administered    INFLUENZA 02/04/2013       TONIE Ross

## 2019-08-06 PROBLEM — I10 ESSENTIAL HYPERTENSION: Status: ACTIVE | Noted: 2019-08-06

## 2019-08-06 LAB
CREAT UR-MCNC: 253 MG/DL
MICROALBUMIN UR-MCNC: 21 MG/L (ref 0–20)
MICROALBUMIN/CREAT 24H UR: 8 MG/G CREATININE (ref 0–30)

## 2019-08-06 PROCEDURE — 3061F NEG MICROALBUMINURIA REV: CPT | Performed by: NURSE PRACTITIONER

## 2019-08-07 NOTE — RESULT ENCOUNTER NOTE
There is a very small amount of protein in urine, which will likely resolve when sugars are well controlled  We will repeat urine in 3-6 months, when sugars are under better control

## 2019-09-23 ENCOUNTER — CONSULT (OUTPATIENT)
Dept: ENDOCRINOLOGY | Facility: CLINIC | Age: 29
End: 2019-09-23
Payer: COMMERCIAL

## 2019-09-23 ENCOUNTER — TELEPHONE (OUTPATIENT)
Dept: ENDOCRINOLOGY | Facility: CLINIC | Age: 29
End: 2019-09-23

## 2019-09-23 VITALS
SYSTOLIC BLOOD PRESSURE: 140 MMHG | BODY MASS INDEX: 44.1 KG/M2 | HEART RATE: 91 BPM | HEIGHT: 71 IN | DIASTOLIC BLOOD PRESSURE: 80 MMHG | WEIGHT: 315 LBS

## 2019-09-23 DIAGNOSIS — E11.65 UNCONTROLLED TYPE 2 DIABETES MELLITUS WITH HYPERGLYCEMIA (HCC): ICD-10-CM

## 2019-09-23 DIAGNOSIS — E11.8 TYPE 2 DIABETES MELLITUS WITH COMPLICATION, WITHOUT LONG-TERM CURRENT USE OF INSULIN (HCC): Primary | ICD-10-CM

## 2019-09-23 DIAGNOSIS — G47.33 OBSTRUCTIVE SLEEP APNEA: Primary | ICD-10-CM

## 2019-09-23 DIAGNOSIS — E55.9 VITAMIN D DEFICIENCY: ICD-10-CM

## 2019-09-23 DIAGNOSIS — E11.9 NEW ONSET TYPE 2 DIABETES MELLITUS (HCC): ICD-10-CM

## 2019-09-23 PROCEDURE — 99244 OFF/OP CNSLTJ NEW/EST MOD 40: CPT | Performed by: INTERNAL MEDICINE

## 2019-09-23 NOTE — TELEPHONE ENCOUNTER
I have sent trulicity to [facility] pharmacy in [facility], Thanks

## 2019-09-23 NOTE — TELEPHONE ENCOUNTER
Medication was sent to the wrong pharmacy, he is requesting the Trulicity goes to the [facility] in [facility]

## 2019-09-23 NOTE — PROGRESS NOTES
Marilou Thomas 33 y o  male MRN: [id number]    Encounter: [id number]      Assessment/Plan     Assessment:  DM type 2  Obesity  Obstructive sleep apnea  Vitamin D deficiency    Plan:    DM type 2 - currently A1C 8 8, continue metformin 1g BID  I have added trulicity 1 5 mg weekly  I will see patient in 3 months, he will have A1c done before his visit  He will have done CMP, CBC, vitamin D, lipid panel and TSH today  He will see optometry and nutritionist today  He will measure his blood sugar before breakfast, lunch and dinner and at bedtime once daily every day during the different time of day  Obesity - continue life-style changes, diet and exercise    Obstructive sleep apnea - patient is morbidly obese with enlarged circumference of neck 19 25 inches, fatigue and snoring  I referred patient for sleep study and consult with sleep medicine specialist  Untreated sleep apnea is responsible for poor blood sugar control and HTN, it may also lead to sudden cardiac arrest      Vitamin D deficiency - patient has most likely vitamin D deficiency 2/2 obesity  I will check vitamin D and replete as needed  - f/u appointment in 3 months    CC: Diabetes    History of Present Illness     HPI: Patient is 33years old male who comes for diabetes evaluation  Patient was diagnosed with diabetes in July 2019 with A1c 12  He was started on metformin 1g BID and his A1C 1 month later in August 2019 - 8 8  Patient is working night shifts and he measures his blood sugar in the afternoon after his wakes up  His values have been in the range   He takes metformin in the morning when he comes home and in the afternoon after he wakes up  He admits to weight loss of 35 lb since he started his new job in January 2019  He had a sedentary job before, right now he moves and is active throughout the day  He will schedule appointment with optometry and nutrition   He was not taking any medications before and he feels motivated to loose weight and improve his overall health  Review of Systems   Constitutional: Positive for fatigue  Negative for chills and fever  HENT: Negative for congestion, postnasal drip and sore throat  Eyes: Negative for pain  Respiratory: Negative for cough and shortness of breath  Cardiovascular: Negative for chest pain, palpitations and leg swelling  Gastrointestinal: Negative for abdominal pain, blood in stool, constipation, nausea, rectal pain and vomiting  Genitourinary: Negative for difficulty urinating and dysuria  Musculoskeletal: Negative for arthralgias and back pain  Neurological: Negative for dizziness, light-headedness and headaches  Psychiatric/Behavioral: Negative for behavioral problems, dysphoric mood and hallucinations  All others negative      Historical Information   Past Medical History:   Diagnosis Date    Blood in stool     Cough with sputum     Pancreatitis     Problems with hearing     Wheezing      Past Surgical History:   Procedure Laterality Date    [facility]    MOUTH SURGERY       Social History   Social History     Substance and Sexual Activity   Alcohol Use Yes    Comment: occasionally     Social History     Substance and Sexual Activity   Drug Use Never     Social History     Tobacco Use   Smoking Status Current Every Day Smoker    Packs/day: 0 50   Smokeless Tobacco Never Used     Family History:   Family History   Problem Relation Age of Onset    Hypertension Father     Diabetes Father     Meniere's disease Maternal Grandfather        Meds/Allergies   Current Outpatient Medications   Medication Sig Dispense Refill    amLODIPine (NORVASC) 10 mg tablet Take 1 tablet (10 mg total) by mouth daily 90 tablet 1    fexofenadine (ALLEGRA) 180 MG tablet Take 180 mg by mouth daily       metFORMIN (GLUCOPHAGE) 500 mg tablet Take 2 tablets (1,000 mg total) by mouth 2 (two) times a day with meals 360 tablet 1    fluticasone (FLONASE) 50 mcg/act nasal spray 2 sprays into each nostril       No current facility-administered medications for this visit  Allergies   Allergen Reactions    Morphine Anaphylaxis     Anaphylaxis    Guaifenesin Hives    Losartan Abdominal Pain     Muscle cramps    Penicillins Rash    Sulfa Antibiotics Rash       Objective   Vitals: Blood pressure 140/80, pulse 91, height 5' 11" (1 803 m), weight (!) 169 kg (372 lb)  Physical Exam   Constitutional: He is oriented to person, place, and time  He appears well-developed and well-nourished  Morbidly obese   HENT:   Head: Normocephalic and atraumatic  Eyes: Pupils are equal, round, and reactive to light  Conjunctivae are normal    Neck: Neck supple  Neck circumference 19 25 inch  Cardiovascular: Normal rate and regular rhythm  No murmur heard  Pulmonary/Chest: Effort normal and breath sounds normal  No respiratory distress  Abdominal: Soft  He exhibits no distension  There is no tenderness  Musculoskeletal: He exhibits no edema  Neurological: He is alert and oriented to person, place, and time  Skin: Skin is warm and dry  Psychiatric: He has a normal mood and affect  The history was obtained from the review of the chart, patient  Lab Results:   Lab Results   Component Value Date/Time    Hemoglobin A1C 8 8 (A) 08/05/2019 10:20 AM       Imaging Studies: I have personally reviewed pertinent reports  Portions of the record may have been created with voice recognition software  Occasional wrong word or "sound a like" substitutions may have occurred due to the inherent limitations of voice recognition software  Read the chart carefully and recognize, using context, where substitutions have occurred

## 2019-09-24 DIAGNOSIS — D72.829 LEUKOCYTOSIS, UNSPECIFIED TYPE: ICD-10-CM

## 2019-09-24 DIAGNOSIS — R79.89 ELEVATED LFTS: ICD-10-CM

## 2019-09-24 DIAGNOSIS — E55.9 VITAMIN D DEFICIENCY: Primary | ICD-10-CM

## 2019-09-24 RX ORDER — ERGOCALCIFEROL 1.25 MG/1
50000 CAPSULE ORAL
Qty: 12 CAPSULE | Refills: 0 | Status: SHIPPED | OUTPATIENT
Start: 2019-09-24 | End: 2020-01-13 | Stop reason: ALTCHOICE

## 2019-10-07 ENCOUNTER — TELEPHONE (OUTPATIENT)
Dept: ENDOCRINOLOGY | Facility: CLINIC | Age: 29
End: 2019-10-07

## 2019-10-07 ENCOUNTER — OFFICE VISIT (OUTPATIENT)
Dept: DIABETES SERVICES | Facility: CLINIC | Age: 29
End: 2019-10-07
Payer: COMMERCIAL

## 2019-10-07 VITALS — WEIGHT: 315 LBS | BODY MASS INDEX: 50.77 KG/M2

## 2019-10-07 DIAGNOSIS — E11.9 NEWLY DIAGNOSED DIABETES (HCC): Primary | ICD-10-CM

## 2019-10-07 PROCEDURE — 97802 MEDICAL NUTRITION INDIV IN: CPT | Performed by: DIETITIAN, REGISTERED

## 2019-10-07 NOTE — TELEPHONE ENCOUNTER
Patient was seen a few weeks ago and was prescribed Trulicity but it is too expensive  Can you find a less expensive alternative? He uses the [facility] in [place]  Please call him at [phone] to discuss options  Thank you

## 2019-10-07 NOTE — PROGRESS NOTES
Medical Nutrition Therapy        Assessment    Visit Type: Initial visit  Chief complaint/Medical Diagnosis/reason for visit E11 9 (New Onset T2DM)    CHICA BARBOSA was seen today for his initial MNT visit  His wife was with him at the visit  Patient reports a 30 pound unexplained weight loss prior to his diagnosis due to hyperglycemia  FAMILIA works night shift at [employer] and admits to poor dietary habits in the past   Dietary modification he has initiated includes cutting out regular soda and white bread and monitoring portion control  Problems identified in food recall include inconsistent carbohydrate intake at meals, occasional meal skipping, poorly timed meals, large portions, poor food choices, low intake of plant based foods, whole grains and low fat dairy and general lack of balance  Provided patient with a 2500 calorie meal plan to assist with consistency, balance and portion control  Encouraged the consumption of regular meals at regular times about 5 hours apart  Advised patient to keep carbohydrate intake to 60 grams per meal and 15-30 grams per snack to assist with glycemic control  Suggested keeping protein intake to 12 ounces a day and fat to 6 servings daily to assist with lipid management and calorie control  Patient agreed to keep daily food logs  Follow-up was suggested in 6 weeks  RD will remain available for further dietary questions/concerns      Ht Readings from Last 1 Encounters:   09/23/19 5' 11" (1 803 m)     Wt Readings from Last 3 Encounters:   10/07/19 (!) 165 kg (364 lb)   09/23/19 (!) 169 kg (372 lb)   08/05/19 (!) 171 kg (377 lb 9 6 oz)     Weight Change: Yes 30 pound unplanned weight loss since February due to high BG    Barriers to Learning: hearing (partially deaf in one ear)    Do you follow any special diet presently?: No  Who shops: patient and spouse  Who cooks: patient and spouse    Food Log: Completed via the method of food recall    Breakfast:4:30-5:30PM before work: 9 oz chicken breast or 6 oz beef, 1 cup potatoes or 1/2 cup rice, water or crystal,light iced tea  Morning Snack:8:--9:00PM 100 calorie snack of Ritz PB crackers x 2 OR vending machine bag of cheetos  Lunch:12:00-1:00AM leftovers from dinner: 3 oz chicken, 1 25 cups potato, diet soda  Afternoon Snack: 3:00-4:00AM rarely eats anything at this time; occasionally 1 small bag crackers  Dinner:6:30-7:30AM SKIPS 2-3 times a week: bowl of Honey Bunches of Oats with almonds cereal (anywhere from 1-4 cups), 1/2 -2 cups of skim milk or 3 days a week: Bethea's 1 sausage egg Mc Muffin, hash brown and diet soda OR every 2 weeks:[employer] breakfast quesadilla with sausage and eggs  Evening Snack:none  Beverages: water and diet beverages  Eating out/Take out:3 days a week or more  Exercise nothing beyond daily activity    Calorie needs 2500 kcals/day Carbs: 60g/meal, 15-30g/snack     Fat: 6 servings/day    Protein:12 ozs/day    Nutrition Diagnosis:  Food and nutrition related knowledge deficit  related to Lack of prior exposure to accurate nutrition related information as evidenced by No prior knowledge of need for food and nutrition related recommendations    Intervention: plate method, reduced fat intake, label reading, behavior modification strategies, carbohydrate counting, increased plant based foods, meal timing, individualized meal plan, monitoring portion control, exercise guidelines and food diary     Treatment Goals: Patient will consume 3 meals a day, Patient will count carbohydrates and Patient will exercise    Monitoring and evaluation:    Term code indicator  FH 1 3 2 Food Intake Criteria: Consume 3 meals a day about 5 hours apart with between meal snacks  Term code indicator  FH 1 6 3 Carbohydrate Intake Criteria:  60 grams of carbohydrate per meal and no more than 30 grams per snack    Term code indicator  CH 2 2 Treatments/Therapy/Alternative Medicine Criteria:  Initiate aerobic exercise  Materials Provided: Portions Booklet, individualized meal plan    Patients Response to Instruction:  Comprehensiongood  Motivationgood  Expected Compliancegood    Start- Stop: 9:00-10:00  Total Minutes: 60 Minutes  Group or Individual Instruction: DSMT-I  Other: TONIE Mcdonald    Thank you for coming to the [facility] for education today  Please feel free to call with any questions or concerns      Azucena Landers  [address] [place] [phone]

## 2019-10-07 NOTE — TELEPHONE ENCOUNTER
He should talk to his insurance company and/or pharmacist to find an alternative that is cheaper on his insurance plan

## 2019-10-07 NOTE — PATIENT INSTRUCTIONS
1   Consume 3 meals a day about 5 hours apart with between meal snacks  2  60 grams of carbohydrate per meal and no more than 30 grams per snack  3   Initiate aerobic exercise

## 2019-10-28 ENCOUNTER — OFFICE VISIT (OUTPATIENT)
Dept: SLEEP CENTER | Facility: CLINIC | Age: 29
End: 2019-10-28
Payer: COMMERCIAL

## 2019-10-28 VITALS
DIASTOLIC BLOOD PRESSURE: 74 MMHG | WEIGHT: 315 LBS | BODY MASS INDEX: 44.1 KG/M2 | HEIGHT: 71 IN | SYSTOLIC BLOOD PRESSURE: 120 MMHG | HEART RATE: 88 BPM

## 2019-10-28 DIAGNOSIS — G25.81 RLS (RESTLESS LEGS SYNDROME): ICD-10-CM

## 2019-10-28 DIAGNOSIS — G47.19 EXCESSIVE DAYTIME SLEEPINESS: ICD-10-CM

## 2019-10-28 DIAGNOSIS — R35.1 NOCTURIA: ICD-10-CM

## 2019-10-28 DIAGNOSIS — R06.81 WITNESSED EPISODE OF APNEA: ICD-10-CM

## 2019-10-28 DIAGNOSIS — G47.8 NON-RESTORATIVE SLEEP: ICD-10-CM

## 2019-10-28 DIAGNOSIS — R06.83 SNORING: Primary | ICD-10-CM

## 2019-10-28 DIAGNOSIS — G47.33 OBSTRUCTIVE SLEEP APNEA: ICD-10-CM

## 2019-10-28 PROCEDURE — 99244 OFF/OP CNSLTJ NEW/EST MOD 40: CPT | Performed by: PSYCHIATRY & NEUROLOGY

## 2019-10-28 NOTE — PATIENT INSTRUCTIONS
Recommendations:  1) In lab diagnostic Polysomnography   2) Driving safety was reviewed with patient  If the patient feels too sleepy to drive he knows not to drive  If he becomes sleepy while driving he will pull over and nap  3) Follow-up after initiation of treatment if needed  4) Iron studies  5) Call with any questions or concerns     6) continue to lose weight
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
## 2019-10-28 NOTE — LETTER
October 28, 2019     TONIE Guerrero  [address] [facility] [address]    Patient: Mike Natarajan   YOB: 1990   Date of Visit: 10/28/2019       Dear Dr Carlita Scott: Thank you for referring Elly Roche to me for evaluation  Below are my notes for this consultation  If you have questions, please do not hesitate to call me  I look forward to following your patient along with you  Sincerely,        Travis Marks MD        CC: MD Travis Villarreal MD  10/28/2019  9:55 AM  Sign at close encounter  Sleep Medicine Consultation Note    HPI:  Mr Mike Natarajan is a 33 y o  male seen at the request of Benjamin Horn MD for advice regarding suspected sleep disordered breathing  The patient stated that he recently was diagnosed with diabetes and his endocrinologist referred him because he is always tired  This has been ongoing for "as long as I could remember " This has worsened over time and he used to blame it on working night shift, "but I think its beyond that now " He has been working nights for 6 years, his tiredness was not as bad before then  He works a variable schedule as far as how many days a week he works, but they are all night shifts  He denied trouble sleeping during the day  Some days he has trouble falling asleep and then other days wakes up hourly  Other days he will sleep the entire day and now wake up  Tiredness varies as well  He may not have it as bad on some nights at work, other days he will fall asleep at work during a break  Caffeine makes him more tired  He denied anything making it better  He tries to stay away from caffeine  Denied trouble falling asleep at night but then he will sleep during the day too on his days off      Please see below for continuation of the HPI:      Sleep Disordered Breathing:  -Snoring: yes   -Severity: very loud   -Frequency: every night   -Duration: since he was a teen   -Over time: worsened probably   -Modifying factors: unsure  -Observed Apneas: yes  -Mouth Breathing at night: yes  -Dry Mouth in morning: yes   -Nocturnal Gasping: no  -Nasal Obstruction: no  -Weight: fluctuates, now losing weight  In the last year to 18 months he has gained 50 lbs and has lost 40 lbs since February  Sleep Pattern:  -Location: bedroom  -Bed/Recliner/Wedge: bed  -Bed Partner: some times  -HOB: flat  -# of pillows under head: uses a blanket or 1 pillow  -Position: side  -Bedtime: 9am  -Lights out: same time  -Environmental: has music or TV playing and he sleeps with it on  TV will usually turn off after an hour or 90 mins  Music stays on all day  -Latency: 30 mins  -Awakenings: 4   -Reason: bathroom, other times wakes up unsure why   -Duration: mins to longer  -Wake time: 330pm   -Alarm: yes  -Rise time: 4pm  -Days off: 9p-630a  -Shift Work: night shift  -Patient's estimate of total sleep time: 8h on a good day, on a normal day 6 5h    Daytime Symptoms:  -Upon Awakening: tired  -Daytime fatigue/sleepiness: tiredness comes in waves  Sometimes sleepy  -Naps: on days off  -Involuntary Dozing: sometimes at work on break  -Cognitive Symptoms: denied  -Driving: Difficulty with sleepiness and driving:  Denied  He gets tired but not sleepy  -- Close calls related to sleepiness: denied   -- Accidents related to sleepiness: denied      Questionnaires:   Sitting and reading: Moderate chance of dozing  Watching TV: High chance of dozing  Sitting, inactive in a public place (e g  a theatre or a meeting): Slight chance of dozing  As a passenger in a car for an hour without a break:  Moderate chance of dozing  Lying down to rest in the afternoon when circumstances permit: Moderate chance of dozing  Sitting and talking to someone: Would never doze  Sitting quietly after a lunch without alcohol: Slight chance of dozing  In a car, while stopped for a few minutes in traffic: Slight chance of dozing  Total score: 12      Sleep Review of Symptoms:  -Parasomnias:  --Sleep Walking: denied as an adult  --Dream Enactment: does not remember what he was dreaming about but his gf has told him that he has hit her with his arm while asleep at times  --Bruxism: no  -Motor:  --RLS: yes, has been ongoing for many years  --PLMS: maybe, since he wakes up without his blankets  -Narcolepsy:  --Hallucinations: denied  --Paralysis: denied  --Cataplexy: denied    Childhood Sleep History: sleepwalking    Prior Sleep Studies/Evaluations:  no    Family History:  Family history of sleep disorders: paternal grandmother and uncle have LINA  Grandmother has RLS    Patient Active Problem List   Diagnosis    Ulcerative colitis ([facility] )    Class 3 severe obesity due to excess calories with body mass index (BMI) of 50 0 to 59 9 in [facility])    Hearing loss in left ear    Tobacco abuse    Uncontrolled type 2 diabetes mellitus with hyperglycemia ([facility] )    Essential hypertension    Obstructive sleep apnea    Vitamin D deficiency     Past Medical History:   Diagnosis Date    Blood in stool     Cough with sputum     Pancreatitis     Problems with hearing     Wheezing          --> Denies any other cardiopulmonary disease  --> Seizure hx: denies  --> Head injury with LOC: LOC with concussion at the age of 16    --> Supplemental Oxygen Use: denies    Labs       Results for Umesh Leon (MRN [id number]) as of 10/28/2019 09:20   Ref   Range 8/5/2019 10:20   Hemoglobin A1C Latest Ref Range: 6 5  8 8 (A)       Past Surgical History:   Procedure Laterality Date    COLONOSCOPY  2008    [facility]    MOUTH SURGERY         --> ENT procedures: denies    Current Outpatient Medications   Medication Sig Dispense Refill    amLODIPine (NORVASC) 10 mg tablet Take 1 tablet (10 mg total) by mouth daily 90 tablet 1    Dulaglutide (TRULICITY) 1 5 DS/2 5FG SOPN Inject 0 5 mL (1 5 mg total) under the skin once a week 12 pen 3    ergocalciferol (VITAMIN D2) 50,000 units Take 1 capsule (50,000 Units total) by mouth every 14 (fourteen) days for 12 doses 12 capsule 0    fexofenadine (ALLEGRA) 180 MG tablet Take 180 mg by mouth daily       fluticasone (FLONASE) 50 mcg/act nasal spray 2 sprays into each nostril      metFORMIN (GLUCOPHAGE) 500 mg tablet Take 2 tablets (1,000 mg total) by mouth 2 (two) times a day with meals 360 tablet 1     No current facility-administered medications for this visit  Social History:  -Employment:   -Smokin/2 PPD and chews on occasion  -Caffeine: 8 oz of soda a day  -Alcohol: socially  -THC: no  -OTC/Supplements/herbals: no  -Illicits:  denies  -Family: girlfriend and step daughter live with him    ROS:  Genitourinary need to urinate more than twice a night   Cardiology none   Gastrointestinal none   Neurology need to move extremities   Constitutional none   Integumentary none   Psychiatry depression and mood change   Musculoskeletal leg cramps   Pulmonary shortness of breath with activity and snoring   ENT none   Endocrine none   Hematological none       MSE:  -Alert and appropriate: calm, cooperative, alert  -Oriented to person, place and time:  name, age, location, day/date/mon/yr  -Behavior: good, sustained eye contact  -Speech: Unremarkable rate/rhythm/volume  -Mood: "im here"  -Affect: blunted  -Thought Processes: linear, logical, goal directed  PE:  Body mass index is 50 07 kg/m²    Vitals:    10/28/19 0900   BP: 120/74   Pulse: 88   Weight: (!) 163 kg (359 lb)   Height: 5' 11" (1 803 m)       -General:  In NAD    -Eyes: Conjunctival injection: none   -EOM:  PERRLA, EOMI   -Eyelid hooding: no    -ENT: MP: 3/4   -Facial deformity: no retrognathia   -Hard palate: moderate to narrow arch   -Soft palate:  Crowding 3 + bilateral tonsils   -Gums and teeth: normal dentition   -Tongue:  Scalloping   -Nares:  Patent    -Neck/Lymphatics: Lymphadenopathy:  none appreciated   -Masses:  none appreciated   -Circumference: Neck Circumference: 18 "    -Cardiac: Auscultation:  RRR   - LE edema over shins: none appreciated    -Pulm: -Respirations: unlaboured         -Auscultation:  CTA bilaterally, posterior fields    -Neuro: No resting tremor     -Musculoskeletal: Gait and stance: normal turning and ambulation; unremarkable  Assessment:  Mr Omega Machado is a 33 y o  male who is seen to evaluate for possible obstructive sleep apnea  Given the patient's symptoms of observed apneas, snoring, RLS, restless sleep, daytime tiredness/sleepiness, non-restorative sleep, and nocturia in the context of a narrow airway, large neck girth, 3+ tonsillar hypertrophy, obesity, and a family history of RLS and LINA, a diagnosis of obstructive sleep apnea is very likely  The pathophysiology of, the reasons to treat and treatment options for obstructive sleep apnea were all reviewed with the patient today  Discussed the testing options and reviewed the benefits and downsides of both, patient opted for the in lab testing  He is amenable to treatment with PAP therapy  Discussed keeping nasal passages clear, abstaining from alcohol, and other sedating drugs at night- which will worsen symptoms of LINA  Iron studies will be done to assess ferratin stores  He has Ulcerative colitis which increases his risk of anemia and bleeding  --History provided by: patient   --Records reviewed: in chart      Recommendations:  1) In lab diagnostic Polysomnography   2) Driving safety was reviewed with patient  If the patient feels too sleepy to drive he knows not to drive  If he becomes sleepy while driving he will pull over and nap  3) Follow-up after initiation of treatment if needed  4) Iron studies  5) Call with any questions or concerns  All questions answered for the patient, who indicated understanding and agreed with the plan       Lauren Rosado MD  Psychiatry/ Sleep medicine

## 2019-11-20 ENCOUNTER — TELEPHONE (OUTPATIENT)
Dept: SLEEP CENTER | Facility: CLINIC | Age: 29
End: 2019-11-20

## 2020-01-12 NOTE — PROGRESS NOTES
Cardiology Follow Up    Raghu Jose  1990  [id number]  [facility]  [address]  [place] [address]  [phone]  [phone]    1  Essential (primary) hypertension     2  Abnormal EKG     3  Tobacco abuse         Interval History:  Patient is here for a follow-up visit  He was last seen by me in March 2019  Since that time he had an echocardiogram done as well in March which demonstrated a hyperdynamic left ventricle with an ejection fraction of 70%  LV wall thickness was upper limits of normal   Left atrial cavity size is well was upper limits of normal   There was no significant valvular heart disease  Patient is on amlodipine in reference to hypertension  He has diabetes mellitus  Blood pressure today is acceptable  It is much improved from his prior visit  He has stopped drinking sugar containing sodas  He has lost weight  He does feel better  He has had no chest pain or significant dyspnea      Patient Active Problem List   Diagnosis    Ulcerative colitis ([facility] )    Class 3 severe obesity due to excess calories with body mass index (BMI) of 50 0 to 59 9 in adult [facility])    Hearing loss in left ear    Tobacco abuse    Uncontrolled type 2 diabetes mellitus with hyperglycemia ([facility] )    Essential hypertension    Obstructive sleep apnea    Vitamin D deficiency     Past Medical History:   Diagnosis Date    Blood in stool     Cough with sputum     Pancreatitis     Problems with hearing     Wheezing      Social History     Socioeconomic History    Marital status: Single     Spouse name: Not on file    Number of children: Not on file    Years of education: Not on file    Highest education level: Not on file   Occupational History    Not on file   Social Needs    Financial resource strain: Not on file    Food insecurity:     Worry: Not on file     Inability: Not on file    Transportation needs: Medical: Not on file     Non-medical: Not on file   Tobacco Use    Smoking status: Current Every Day Smoker     Packs/day: 0 50    Smokeless tobacco: Current User     Types: Chew   Substance and Sexual Activity    Alcohol use: Yes     Comment: about twice a month    Drug use: Never    Sexual activity: Not on file   Lifestyle    Physical activity:     Days per week: Not on file     Minutes per session: Not on file    Stress: Not on file   Relationships    Social connections:     Talks on phone: Not on file     Gets together: Not on file     Attends Cheondoism service: Not on file     Active member of club or organization: Not on file     Attends meetings of clubs or organizations: Not on file     Relationship status: Not on file    Intimate partner violence:     Fear of current or ex partner: Not on file     Emotionally abused: Not on file     Physically abused: Not on file     Forced sexual activity: Not on file   Other Topics Concern    Not on file   Social History Narrative    Not on file      Family History   Problem Relation Age of Onset    Hypertension Father     Diabetes Father     Meniere's disease Maternal Grandfather      Past Surgical History:   Procedure Laterality Date    COLONOSCOPY  2008    [facility]    MOUTH SURGERY         Current Outpatient Medications:     amLODIPine (NORVASC) 10 mg tablet, Take 1 tablet (10 mg total) by mouth daily, Disp: 90 tablet, Rfl: 1    fexofenadine (ALLEGRA) 180 MG tablet, Take 180 mg by mouth daily , Disp: , Rfl:     fluticasone (FLONASE) 50 mcg/act nasal spray, 2 sprays into each nostril, Disp: , Rfl:     metFORMIN (GLUCOPHAGE) 500 mg tablet, Take 2 tablets (1,000 mg total) by mouth 2 (two) times a day with meals, Disp: 360 tablet, Rfl: 1  Allergies   Allergen Reactions    Morphine Anaphylaxis     Anaphylaxis    Guaifenesin Hives    Losartan Abdominal Pain     Muscle cramps    Penicillins Rash    Sulfa Antibiotics Rash       Labs:not applicable  Imaging: No results found  Review of Systems:  Review of Systems   All other systems reviewed and are negative  Physical Exam:  /82 (BP Location: Right arm, Patient Position: Sitting, Cuff Size: Large)   Pulse 82   Ht 5' 11" (1 803 m)   Wt (!) 166 kg (365 lb 6 4 oz)   BMI 50 96 kg/m²   Physical Exam   Constitutional: He is oriented to person, place, and time  He appears well-developed and well-nourished  HENT:   Head: Normocephalic and atraumatic  Eyes: Pupils are equal, round, and reactive to light  Conjunctivae are normal    Neck: Normal range of motion  Neck supple  Cardiovascular: Normal rate and normal heart sounds  Pulmonary/Chest: Effort normal and breath sounds normal    Neurological: He is alert and oriented to person, place, and time  Skin: Skin is warm and dry  Psychiatric: He has a normal mood and affect  Vitals reviewed  Discussion/Summary:I will continue the patient's present medical regimen  Patient appears well compensated  I have asked the patient to call if there is a problem in the interim otherwise I will see the patient in one years time

## 2020-01-13 ENCOUNTER — OFFICE VISIT (OUTPATIENT)
Dept: FAMILY MEDICINE CLINIC | Facility: CLINIC | Age: 30
End: 2020-01-13
Payer: COMMERCIAL

## 2020-01-13 ENCOUNTER — TELEPHONE (OUTPATIENT)
Dept: FAMILY MEDICINE CLINIC | Facility: CLINIC | Age: 30
End: 2020-01-13

## 2020-01-13 ENCOUNTER — OFFICE VISIT (OUTPATIENT)
Dept: CARDIOLOGY CLINIC | Facility: CLINIC | Age: 30
End: 2020-01-13
Payer: COMMERCIAL

## 2020-01-13 ENCOUNTER — LAB (OUTPATIENT)
Dept: LAB | Facility: CLINIC | Age: 30
End: 2020-01-13
Payer: COMMERCIAL

## 2020-01-13 VITALS
SYSTOLIC BLOOD PRESSURE: 158 MMHG | OXYGEN SATURATION: 97 % | HEART RATE: 88 BPM | BODY MASS INDEX: 44.1 KG/M2 | HEIGHT: 71 IN | WEIGHT: 315 LBS | TEMPERATURE: 98 F | DIASTOLIC BLOOD PRESSURE: 100 MMHG | RESPIRATION RATE: 16 BRPM

## 2020-01-13 VITALS
BODY MASS INDEX: 44.1 KG/M2 | WEIGHT: 315 LBS | SYSTOLIC BLOOD PRESSURE: 138 MMHG | HEART RATE: 82 BPM | HEIGHT: 71 IN | DIASTOLIC BLOOD PRESSURE: 82 MMHG

## 2020-01-13 DIAGNOSIS — E11.9 TYPE 2 DIABETES MELLITUS WITHOUT COMPLICATION, WITHOUT LONG-TERM CURRENT USE OF INSULIN (HCC): Primary | ICD-10-CM

## 2020-01-13 DIAGNOSIS — E66.01 CLASS 3 SEVERE OBESITY DUE TO EXCESS CALORIES WITH SERIOUS COMORBIDITY AND BODY MASS INDEX (BMI) OF 50.0 TO 59.9 IN ADULT (HCC): ICD-10-CM

## 2020-01-13 DIAGNOSIS — E11.65 UNCONTROLLED TYPE 2 DIABETES MELLITUS WITH HYPERGLYCEMIA (HCC): ICD-10-CM

## 2020-01-13 DIAGNOSIS — D72.829 LEUKOCYTOSIS, UNSPECIFIED TYPE: ICD-10-CM

## 2020-01-13 DIAGNOSIS — I10 HYPERTENSION, UNSPECIFIED TYPE: ICD-10-CM

## 2020-01-13 DIAGNOSIS — R94.31 ABNORMAL EKG: ICD-10-CM

## 2020-01-13 DIAGNOSIS — G47.33 OBSTRUCTIVE SLEEP APNEA: ICD-10-CM

## 2020-01-13 DIAGNOSIS — I10 ESSENTIAL (PRIMARY) HYPERTENSION: Primary | ICD-10-CM

## 2020-01-13 DIAGNOSIS — G25.81 RLS (RESTLESS LEGS SYNDROME): ICD-10-CM

## 2020-01-13 DIAGNOSIS — Z72.0 TOBACCO ABUSE: ICD-10-CM

## 2020-01-13 DIAGNOSIS — R79.89 ELEVATED LFTS: ICD-10-CM

## 2020-01-13 DIAGNOSIS — E55.9 VITAMIN D DEFICIENCY: ICD-10-CM

## 2020-01-13 DIAGNOSIS — E11.9 NEW ONSET TYPE 2 DIABETES MELLITUS (HCC): ICD-10-CM

## 2020-01-13 DIAGNOSIS — I10 ESSENTIAL HYPERTENSION: ICD-10-CM

## 2020-01-13 LAB
25(OH)D3 SERPL-MCNC: 12.1 NG/ML (ref 30–100)
ALBUMIN SERPL BCP-MCNC: 3.9 G/DL (ref 3.5–5)
ALP SERPL-CCNC: 85 U/L (ref 46–116)
ALT SERPL W P-5'-P-CCNC: 110 U/L (ref 12–78)
ANION GAP SERPL CALCULATED.3IONS-SCNC: 6 MMOL/L (ref 4–13)
AST SERPL W P-5'-P-CCNC: 29 U/L (ref 5–45)
BASOPHILS # BLD AUTO: 0.07 THOUSANDS/ΜL (ref 0–0.1)
BASOPHILS NFR BLD AUTO: 1 % (ref 0–1)
BILIRUB SERPL-MCNC: 0.37 MG/DL (ref 0.2–1)
BUN SERPL-MCNC: 18 MG/DL (ref 5–25)
CALCIUM SERPL-MCNC: 8.9 MG/DL (ref 8.3–10.1)
CHLORIDE SERPL-SCNC: 108 MMOL/L (ref 100–108)
CO2 SERPL-SCNC: 27 MMOL/L (ref 21–32)
CREAT SERPL-MCNC: 0.87 MG/DL (ref 0.6–1.3)
EOSINOPHIL # BLD AUTO: 0.31 THOUSAND/ΜL (ref 0–0.61)
EOSINOPHIL NFR BLD AUTO: 3 % (ref 0–6)
ERYTHROCYTE [DISTWIDTH] IN BLOOD BY AUTOMATED COUNT: 13.6 % (ref 11.6–15.1)
GFR SERPL CREATININE-BSD FRML MDRD: 117 ML/MIN/1.73SQ M
GLUCOSE SERPL-MCNC: 117 MG/DL (ref 65–140)
HCT VFR BLD AUTO: 42.9 % (ref 36.5–49.3)
HGB BLD-MCNC: 14 G/DL (ref 12–17)
IMM GRANULOCYTES # BLD AUTO: 0.08 THOUSAND/UL (ref 0–0.2)
IMM GRANULOCYTES NFR BLD AUTO: 1 % (ref 0–2)
LYMPHOCYTES # BLD AUTO: 2.87 THOUSANDS/ΜL (ref 0.6–4.47)
LYMPHOCYTES NFR BLD AUTO: 26 % (ref 14–44)
MCH RBC QN AUTO: 29.2 PG (ref 26.8–34.3)
MCHC RBC AUTO-ENTMCNC: 32.6 G/DL (ref 31.4–37.4)
MCV RBC AUTO: 89 FL (ref 82–98)
MONOCYTES # BLD AUTO: 0.64 THOUSAND/ΜL (ref 0.17–1.22)
MONOCYTES NFR BLD AUTO: 6 % (ref 4–12)
NEUTROPHILS # BLD AUTO: 7.26 THOUSANDS/ΜL (ref 1.85–7.62)
NEUTS SEG NFR BLD AUTO: 63 % (ref 43–75)
NRBC BLD AUTO-RTO: 0 /100 WBCS
PLATELET # BLD AUTO: 276 THOUSANDS/UL (ref 149–390)
PMV BLD AUTO: 10.5 FL (ref 8.9–12.7)
POTASSIUM SERPL-SCNC: 4 MMOL/L (ref 3.5–5.3)
PROT SERPL-MCNC: 7.6 G/DL (ref 6.4–8.2)
RBC # BLD AUTO: 4.8 MILLION/UL (ref 3.88–5.62)
SL AMB POCT HEMOGLOBIN AIC: 5.6 (ref ?–6.5)
SODIUM SERPL-SCNC: 141 MMOL/L (ref 136–145)
TSH SERPL DL<=0.05 MIU/L-ACNC: 3.13 UIU/ML (ref 0.36–3.74)
WBC # BLD AUTO: 11.23 THOUSAND/UL (ref 4.31–10.16)

## 2020-01-13 PROCEDURE — 84443 ASSAY THYROID STIM HORMONE: CPT

## 2020-01-13 PROCEDURE — 99214 OFFICE O/P EST MOD 30 MIN: CPT | Performed by: NURSE PRACTITIONER

## 2020-01-13 PROCEDURE — 3008F BODY MASS INDEX DOCD: CPT | Performed by: PSYCHIATRY & NEUROLOGY

## 2020-01-13 PROCEDURE — 82306 VITAMIN D 25 HYDROXY: CPT

## 2020-01-13 PROCEDURE — 85025 COMPLETE CBC W/AUTO DIFF WBC: CPT

## 2020-01-13 PROCEDURE — 36415 COLL VENOUS BLD VENIPUNCTURE: CPT

## 2020-01-13 PROCEDURE — 80053 COMPREHEN METABOLIC PANEL: CPT

## 2020-01-13 PROCEDURE — 4004F PT TOBACCO SCREEN RCVD TLK: CPT | Performed by: NURSE PRACTITIONER

## 2020-01-13 PROCEDURE — 83036 HEMOGLOBIN GLYCOSYLATED A1C: CPT | Performed by: NURSE PRACTITIONER

## 2020-01-13 PROCEDURE — 3079F DIAST BP 80-89 MM HG: CPT | Performed by: INTERNAL MEDICINE

## 2020-01-13 PROCEDURE — 99214 OFFICE O/P EST MOD 30 MIN: CPT | Performed by: INTERNAL MEDICINE

## 2020-01-13 PROCEDURE — 3044F HG A1C LEVEL LT 7.0%: CPT | Performed by: PSYCHIATRY & NEUROLOGY

## 2020-01-13 PROCEDURE — 3075F SYST BP GE 130 - 139MM HG: CPT | Performed by: INTERNAL MEDICINE

## 2020-01-13 PROCEDURE — 3044F HG A1C LEVEL LT 7.0%: CPT | Performed by: NURSE PRACTITIONER

## 2020-01-13 NOTE — PROGRESS NOTES
FAMILY PRACTICE OFFICE VISIT       NAME: Eve Perry  AGE: 30 y o  SEX: male       : 1990        MRN: [id number]      Assessment and Plan     Problem List Items Addressed This Visit        Endocrine    Type 2 diabetes mellitus without complication, without long-term current use of insulin ([facility] [address] ) - Primary       Lab Results   Component Value Date    HGBA1C 5 6 2020     Remarkable improvement in A1c over the past 6 months  Started initially with an A1c of 12%, reduced to 8 8% after starting metformin  He has been working on lifestyle changes, losing weight, continues with metformin 1000 mg twice daily  A1c is now down to 5 6%  He was prescribed Trulicity, by endocrinology, but was not able to fill the prescription due to cost    States today he just got new insurance, and would like to look into the cost of Trulicity with new insurance  I have advised him A1c is excellent, and he does not need to proceed with taking Trulicity at this time  Continue metformin 1000 mg twice daily  Continue follow-up with endocrinology as scheduled next week  Continue to work on lifestyle modifications, including weight loss, diet, exercise  He will repeat blood work including A1c and follow up in office in 3 months  States he had an eye exam at endocrinology office at last visit  Will inquire with Dr Elmer Mina office regarding this  Relevant Orders    CBC    Comprehensive metabolic panel    Hemoglobin A1C    Lipid panel    TSH, 3rd generation    POCT hemoglobin A1c (Completed)       Respiratory    Obstructive sleep apnea     Scheduled for sleep study tomorrow morning  Cardiovascular and Mediastinum    Essential hypertension     BP elevated in office today  Currently taking Amlodipine 10 mg daily  Has a cardiology office visit later today           Relevant Orders    CBC    Comprehensive metabolic panel    Hemoglobin A1C    Lipid panel    TSH, 3rd generation       Other    Class 3 severe obesity due to excess calories with body mass index (BMI) of 50 0 to 59 9 in adult ([facility] )     BMI Counseling: Body mass index is 51 19 kg/m²  The BMI is above normal  Nutrition recommendations include reducing portion sizes, decreasing overall calorie intake, decreasing soda and/or juice intake, moderation in carbohydrate intake and increasing intake of lean protein  Exercise recommendations include moderate aerobic physical activity for 150 minutes/week  Congratulated on weight loss of nearly 20 pounds, encouraged to keep working on this  Tobacco abuse     Tobacco Cessation Counseling: Tobacco cessation counseling and education was provided  The patient is sincerely urged to quit consumption of tobacco  He is not ready to quit tobacco  The numerous health risks of tobacco consumption were discussed  If he decides to quit, there are a number of helpful adjunctive aids, and he can see me to discuss nicotine replacement therapy, chantix, or bupropion anytime in the future  Repeat blood work and follow up in office in 3 months  1  Type 2 diabetes mellitus without complication, without long-term current use of insulin (HCC)  CBC    Comprehensive metabolic panel    Hemoglobin A1C    Lipid panel    TSH, 3rd generation    POCT hemoglobin A1c   2  Essential hypertension  CBC    Comprehensive metabolic panel    Hemoglobin A1C    Lipid panel    TSH, 3rd generation   3  Obstructive sleep apnea     4  Class 3 severe obesity due to excess calories with serious comorbidity and body mass index (BMI) of 50 0 to 59 9 in adult ([facility] )     5  Tobacco abuse             Chief Complaint     Chief Complaint   Patient presents with    Diabetes       History of Present Illness     Margi Garrett is a 59-year-old male presenting today for follow-up visit  Newly diagnosed diabetic in July  Since last office visit, he has been compliant with taking metformin    He did not fill his Trulicity prescription, because it was too costly  He has been to endocrinology  He has gone to diabetic nutrition class  States he learned a lot of diabetic nutrition class, and has made dietary changes  Vagus change has been drinking all sugar free drinks  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   He often works up to 80 hours per week  This inhibits his ability to exercise  But he has been trying to be more active  [place] further away, takes the longer root for walking  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  Checking blood sugars on a regular basis  States his highs blood sugar was 170, notes this was after a holiday meal   He has noted when he does not eat regularly, his sugar drops to 70s-80s, and he feels very sluggish  He has lost weight  When 1st diagnosed in July weight was 388 lb  Currently weighs 367 lb  States he is still trying to lose weight, but it has been difficult  Just completed blood work this morning at the lab  Endocrinology office visit is scheduled next week  Cardiology office visit later today  Sleep study is scheduled for tomorrow morning  Unfortunately continues to smoke  Smokes more work, about a pack per day on work days  About half pack per day on days off  Eye exam was done at endocrinology office per patient report  Review of Systems   Review of Systems   Constitutional: Negative  HENT: Negative  Eyes: Negative  Respiratory: Negative  Cardiovascular: Negative  Gastrointestinal: Negative  Endocrine: Negative  Genitourinary: Negative  Musculoskeletal: Negative  Skin: Negative  Allergic/Immunologic: Negative  Neurological: Negative  Hematological: Negative  Psychiatric/Behavioral: Negative          Active Problem List     Patient Active Problem List   Diagnosis    Ulcerative colitis ([facility] [address] )    Class 3 severe obesity due to excess calories with body mass index (BMI) of 50 0 to 59 9 in adult [facility])    Hearing loss in left ear    Tobacco abuse    Type 2 diabetes mellitus without complication, without long-term current use of insulin (HCC)    Essential hypertension    Obstructive sleep apnea    Vitamin D deficiency       Past Medical History:  Past Medical History:   Diagnosis Date    Blood in stool     Cough with sputum     Pancreatitis     Problems with hearing     Wheezing        Past Surgical History:  Past Surgical History:   Procedure Laterality Date    COLONOSCOPY  2008    [facility]    MOUTH SURGERY         Family History:  Family History   Problem Relation Age of Onset    Hypertension Father     Diabetes Father     Meniere's disease Maternal Grandfather        Social History:  Social History     Socioeconomic History    Marital status: Single     Spouse name: Not on file    Number of children: Not on file    Years of education: Not on file    Highest education level: Not on file   Occupational History    Not on file   Social Needs    Financial resource strain: Not on file    Food insecurity:     Worry: Not on file     Inability: Not on file    Transportation needs:     Medical: Not on file     Non-medical: Not on file   Tobacco Use    Smoking status: Current Every Day Smoker     Packs/day: 0 50    Smokeless tobacco: Current User     Types: Chew   Substance and Sexual Activity    Alcohol use: Yes     Comment: about twice a month    Drug use: Never    Sexual activity: Not on file   Lifestyle    Physical activity:     Days per week: Not on file     Minutes per session: Not on file    Stress: Not on file   Relationships    Social connections:     Talks on phone: Not on file     Gets together: Not on file     Attends Yarsanism service: Not on file     Active member of club or organization: Not on file     Attends meetings of clubs or organizations: Not on file     Relationship status: Not on file    Intimate partner violence:     Fear of current or ex partner: Not on file Emotionally abused: Not on file     Physically abused: Not on file     Forced sexual activity: Not on file   Other Topics Concern    Not on file   Social History Narrative    Not on file       I have reviewed the patient's medical history in detail; there are no changes to the history as noted in the electronic medical record  Objective     Vitals:    01/13/20 0740 01/13/20 0840   BP: 154/90 158/100   BP Location: Right arm    Patient Position: Sitting    Cuff Size: Large    Pulse: 88    Resp: 16    Temp: 98 °F (36 7 °C)    TempSrc: Tympanic    SpO2: 97%    Weight: (!) 166 kg (367 lb)    Height: 5' 11" (1 803 m)      Wt Readings from Last 3 Encounters:   01/13/20 (!) 166 kg (365 lb 6 4 oz)   01/13/20 (!) 166 kg (367 lb)   10/28/19 (!) 163 kg (359 lb)     Physical Exam   Constitutional: He is oriented to person, place, and time  He appears well-developed and well-nourished  No distress  HENT:   Head: Normocephalic and atraumatic  Right Ear: Tympanic membrane normal    Left Ear: Tympanic membrane normal    Mouth/Throat: Oropharynx is clear and moist    Eyes: Pupils are equal, round, and reactive to light  Conjunctivae are normal    Neck: Normal range of motion  Neck supple  No thyromegaly present  Cardiovascular: Normal rate and regular rhythm  No murmur heard  Pulmonary/Chest: Effort normal and breath sounds normal    Abdominal: Soft  Bowel sounds are normal    Musculoskeletal: He exhibits no edema  Lymphadenopathy:     He has no cervical adenopathy  Neurological: He is alert and oriented to person, place, and time  Skin: No rash noted  Psychiatric: He has a normal mood and affect            ALLERGIES:  Allergies   Allergen Reactions    Morphine Anaphylaxis     Anaphylaxis    Guaifenesin Hives    Losartan Abdominal Pain     Muscle cramps    Penicillins Rash    Sulfa Antibiotics Rash       Current Medications     Current Outpatient Medications   Medication Sig Dispense Refill    amLODIPine (NORVASC) 10 mg tablet Take 1 tablet (10 mg total) by mouth daily 90 tablet 1    fexofenadine (ALLEGRA) 180 MG tablet Take 180 mg by mouth daily       fluticasone (FLONASE) 50 mcg/act nasal spray 2 sprays into each nostril      metFORMIN (GLUCOPHAGE) 500 mg tablet Take 2 tablets (1,000 mg total) by mouth 2 (two) times a day with meals 360 tablet 1     No current facility-administered medications for this visit            Health Maintenance     Health Maintenance   Topic Date Due    Pneumococcal Vaccine: Pediatrics (0 to 5 Years) and At-Risk Patients (6 to 59 Years) (1 of 1 - PPSV23) 12/24/1997    DM Eye Exam  12/24/2001    DTaP,Tdap,and Td Vaccines (1 - Tdap) 12/24/2002    HIV Screening  12/24/2006    Hepatitis B Vaccine (1 of 3 - Risk 3-dose series) 12/24/2010    Influenza Vaccine  02/22/2020 (Originally 7/1/2019)    HEMOGLOBIN A1C  02/05/2020    Depression Screening PHQ  02/22/2020    BMI: Followup Plan  07/07/2020    URINE MICROALBUMIN  08/05/2020    Diabetic Foot Exam  08/06/2020    BMI: Adult  01/13/2021    Pneumococcal Vaccine: 65+ Years (1 of 2 - PCV13) 12/24/2056    HIB Vaccine  Aged Out    IPV Vaccine  Aged Out    Hepatitis A Vaccine  Aged Out    Meningococcal ACWY Vaccine  Aged Out    HPV Vaccine  Aged Out     Immunization History   Administered Date(s) Administered    INFLUENZA 02/04/2013       TONIE Goel

## 2020-01-14 ENCOUNTER — HOSPITAL ENCOUNTER (OUTPATIENT)
Dept: SLEEP CENTER | Facility: CLINIC | Age: 30
Discharge: HOME | End: 2020-01-14
Payer: COMMERCIAL

## 2020-01-14 DIAGNOSIS — D72.829 LEUKOCYTOSIS, UNSPECIFIED TYPE: ICD-10-CM

## 2020-01-14 DIAGNOSIS — G47.8 NON-RESTORATIVE SLEEP: ICD-10-CM

## 2020-01-14 DIAGNOSIS — R06.83 SNORING: ICD-10-CM

## 2020-01-14 DIAGNOSIS — G25.81 RLS (RESTLESS LEGS SYNDROME): ICD-10-CM

## 2020-01-14 DIAGNOSIS — R74.01 ELEVATED ALT MEASUREMENT: ICD-10-CM

## 2020-01-14 DIAGNOSIS — R06.81 WITNESSED EPISODE OF APNEA: ICD-10-CM

## 2020-01-14 DIAGNOSIS — E55.9 VITAMIN D DEFICIENCY: Primary | ICD-10-CM

## 2020-01-14 DIAGNOSIS — R35.1 NOCTURIA: ICD-10-CM

## 2020-01-14 DIAGNOSIS — G47.19 EXCESSIVE DAYTIME SLEEPINESS: ICD-10-CM

## 2020-01-14 PROCEDURE — 95810 POLYSOM 6/> YRS 4/> PARAM: CPT | Performed by: PSYCHIATRY & NEUROLOGY

## 2020-01-14 PROCEDURE — 95810 POLYSOM 6/> YRS 4/> PARAM: CPT

## 2020-01-14 RX ORDER — ERGOCALCIFEROL 1.25 MG/1
50000 CAPSULE ORAL WEEKLY
Qty: 12 CAPSULE | Refills: 0 | Status: SHIPPED | OUTPATIENT
Start: 2020-01-14 | End: 2020-09-08 | Stop reason: ALTCHOICE

## 2020-01-14 NOTE — ASSESSMENT & PLAN NOTE
BMI Counseling: Body mass index is 51 19 kg/m²  The BMI is above normal  Nutrition recommendations include reducing portion sizes, decreasing overall calorie intake, decreasing soda and/or juice intake, moderation in carbohydrate intake and increasing intake of lean protein  Exercise recommendations include moderate aerobic physical activity for 150 minutes/week  Congratulated on weight loss of nearly 20 pounds, encouraged to keep working on this

## 2020-01-14 NOTE — ASSESSMENT & PLAN NOTE
Tobacco Cessation Counseling: Tobacco cessation counseling and education was provided  The patient is sincerely urged to quit consumption of tobacco  He is not ready to quit tobacco  The numerous health risks of tobacco consumption were discussed  If he decides to quit, there are a number of helpful adjunctive aids, and he can see me to discuss nicotine replacement therapy, chantix, or bupropion anytime in the future

## 2020-01-14 NOTE — ASSESSMENT & PLAN NOTE
Lab Results   Component Value Date    HGBA1C 5 6 01/13/2020     Remarkable improvement in A1c over the past 6 months  Started initially with an A1c of 12%, reduced to 8 8% after starting metformin  He has been working on lifestyle changes, losing weight, continues with metformin 1000 mg twice daily  A1c is now down to 5 6%  He was prescribed Trulicity, by endocrinology, but was not able to fill the prescription due to cost    States today he just got new insurance, and would like to look into the cost of Trulicity with new insurance  I have advised him A1c is excellent, and he does not need to proceed with taking Trulicity at this time  Continue metformin 1000 mg twice daily  Continue follow-up with endocrinology as scheduled next week  Continue to work on lifestyle modifications, including weight loss, diet, exercise  He will repeat blood work including A1c and follow up in office in 3 months  States he had an eye exam at endocrinology office at last visit  Will inquire with Dr Davina Skiff office regarding this

## 2020-01-14 NOTE — TELEPHONE ENCOUNTER
Can you please contact Dr Shandra Stallings office  Patient reports he had an eye exam for diabetic retinopathy at his office in September  Can you please inquire about this?     TY

## 2020-01-14 NOTE — ASSESSMENT & PLAN NOTE
BP elevated in office today  Currently taking Amlodipine 10 mg daily  Has a cardiology office visit later today

## 2020-01-14 NOTE — PROGRESS NOTES
Sleep Study Documentation    Pre-Sleep Study       Sleep testing procedure explained to patient:YES    Patient napped prior to study:YES- more than 30 minutes  Napped after midnight: yes for approximately 4 hours    Caffeine:Nightshift worker after midnight  Caffeine use:NO    Alcohol:Nightshift workers after 7AM: Alcohol use:NO    Typical day for patient:NO  Patient did not work prior to the sleep study  Study Documentation    Sleep Study Indications: Patient is always tired  Patient was diagnosed with diabetes  Patient works night shift  Loud snoring  Witnessed apneas  Sleep Study: Diagnostic   Snore: Yes, moderate to loud  Supplemental O2: no    O2 flow rate (L/min) range n/a  O2 flow rate (L/min) final n/a  Minimum SaO2 84%  Baseline SaO2  95%      EKG abnormalities: no     EEG abnormalities: no    Sleep Study Recorded < 2 hours: N/A    Sleep Study Recorded > 2 hours but incomplete study: N/A    Sleep Study Recorded 6 hours but no sleep obtained: NO    Patient classification: employed       Post-Sleep Study    Medication used at bedtime or during sleep study:NO    Patient reports time it took to fall asleep:20-30 minutes    Patient reports waking up during study:3 or more times  Patient reports returning to sleep in 10-30 minutes  Patient reports sleeping 4-6 hours without dreaming  Patient reports sleep during study:typical    Patient rated sleepiness: Somewhat sleepy or tired    PAP treatment:no

## 2020-01-15 ENCOUNTER — TELEPHONE (OUTPATIENT)
Dept: FAMILY MEDICINE CLINIC | Facility: CLINIC | Age: 30
End: 2020-01-15

## 2020-01-15 NOTE — RESULT ENCOUNTER NOTE
Please contact patient with results of blood work  --One liver enzyme, ALT was mildly elevated at 110  This is improved from last blood work, when 2 liver enzymes were elevated  --White blood cell count is mildly elevated at 11  This is improved since last blood work, when it was elevated at 14  --Thyroid function is normal   --Vitamin D level is still very low at 12  I have prescribed vitamin d supplement  Please take one capsule once a week for 12 weeks  I placed and order for another vitamin d level to be collected in April with the rest of your blood work  --Please repeat blood count and liver functions in 1 month

## 2020-01-15 NOTE — TELEPHONE ENCOUNTER
----- Message from [address] sent at 1/14/2020  9:11 PM EST -----  Please contact patient with results of blood work  --One liver enzyme, ALT was mildly elevated at 110  This is improved from last blood work, when 2 liver enzymes were elevated  --White blood cell count is mildly elevated at 11  This is improved since last blood work, when it was elevated at 14  --Thyroid function is normal   --Vitamin D level is still very low at 12  I have prescribed vitamin d supplement  Please take one capsule once a week for 12 weeks  I placed and order for another vitamin d level to be collected in April with the rest of your blood work  --Please repeat blood count and liver functions in 1 month

## 2020-01-20 DIAGNOSIS — G47.33 OBSTRUCTIVE SLEEP APNEA: Primary | ICD-10-CM

## 2020-01-20 DIAGNOSIS — G47.61 PLMD (PERIODIC LIMB MOVEMENT DISORDER): ICD-10-CM

## 2020-01-21 ENCOUNTER — TELEPHONE (OUTPATIENT)
Dept: SLEEP CENTER | Facility: CLINIC | Age: 30
End: 2020-01-21

## 2020-01-21 NOTE — TELEPHONE ENCOUNTER
----- Message from Madison Palmer MD sent at 1/20/2020 12:23 PM EST -----  PSG read  CPAP study ordered

## 2020-01-30 ENCOUNTER — TELEPHONE (OUTPATIENT)
Dept: SLEEP CENTER | Facility: CLINIC | Age: 30
End: 2020-01-30

## 2020-01-30 DIAGNOSIS — G47.61 PLMD (PERIODIC LIMB MOVEMENT DISORDER): ICD-10-CM

## 2020-01-30 DIAGNOSIS — G47.33 OBSTRUCTIVE SLEEP APNEA: Primary | ICD-10-CM

## 2020-01-30 NOTE — TELEPHONE ENCOUNTER
----- Message from Travis Marks MD sent at 1/30/2020  9:23 AM EST -----  Ordered APAP because CPAP titration was denied  Follow up in 6-8 weeks

## 2020-01-31 ENCOUNTER — TELEPHONE (OUTPATIENT)
Dept: SLEEP CENTER | Facility: CLINIC | Age: 30
End: 2020-01-31

## 2020-01-31 NOTE — TELEPHONE ENCOUNTER
Spoke with patient, advised above   Scheduled DME set up 2/17/20 at [facility] [place]

## 2020-02-17 ENCOUNTER — TELEPHONE (OUTPATIENT)
Dept: SLEEP CENTER | Facility: CLINIC | Age: 30
End: 2020-02-17

## 2020-02-21 DIAGNOSIS — I10 ESSENTIAL HYPERTENSION: ICD-10-CM

## 2020-03-18 ENCOUNTER — TELEPHONE (OUTPATIENT)
Dept: FAMILY MEDICINE CLINIC | Facility: CLINIC | Age: 30
End: 2020-03-18

## 2020-03-18 NOTE — TELEPHONE ENCOUNTER
I am not able to provide him a note for this  He could consider taking FMLA to care for his daughter

## 2020-03-18 NOTE — TELEPHONE ENCOUNTER
Patient called stating his daughter gets sick easily and wants to know if he would be able to take off of work for 4 weeks with a note or not  He wants to know if it's a good idea  Please advise patient at [phone]

## 2020-04-13 DIAGNOSIS — E55.9 VITAMIN D DEFICIENCY: ICD-10-CM

## 2020-04-13 RX ORDER — ERGOCALCIFEROL 1.25 MG/1
CAPSULE ORAL
Qty: 12 CAPSULE | Refills: 0 | OUTPATIENT
Start: 2020-04-13

## 2020-04-27 ENCOUNTER — TELEMEDICINE (OUTPATIENT)
Dept: SLEEP CENTER | Facility: CLINIC | Age: 30
End: 2020-04-27
Payer: COMMERCIAL

## 2020-04-27 ENCOUNTER — TELEPHONE (OUTPATIENT)
Dept: SLEEP CENTER | Facility: CLINIC | Age: 30
End: 2020-04-27

## 2020-04-27 DIAGNOSIS — G47.33 OSA (OBSTRUCTIVE SLEEP APNEA): Primary | ICD-10-CM

## 2020-04-27 PROCEDURE — 99213 OFFICE O/P EST LOW 20 MIN: CPT | Performed by: PSYCHIATRY & NEUROLOGY

## 2020-06-10 ENCOUNTER — APPOINTMENT (EMERGENCY)
Dept: RADIOLOGY | Facility: HOSPITAL | Age: 30
End: 2020-06-10
Payer: COMMERCIAL

## 2020-06-10 ENCOUNTER — HOSPITAL ENCOUNTER (EMERGENCY)
Facility: HOSPITAL | Age: 30
Discharge: HOME | End: 2020-06-10
Attending: EMERGENCY MEDICINE | Admitting: EMERGENCY MEDICINE
Payer: COMMERCIAL

## 2020-06-10 VITALS
RESPIRATION RATE: 18 BRPM | HEART RATE: 98 BPM | BODY MASS INDEX: 50.91 KG/M2 | TEMPERATURE: 97.9 F | OXYGEN SATURATION: 100 % | SYSTOLIC BLOOD PRESSURE: 179 MMHG | WEIGHT: 315 LBS | DIASTOLIC BLOOD PRESSURE: 101 MMHG

## 2020-06-10 DIAGNOSIS — M25.572 CHRONIC PAIN OF LEFT ANKLE: Primary | ICD-10-CM

## 2020-06-10 DIAGNOSIS — G89.29 CHRONIC PAIN OF LEFT ANKLE: Primary | ICD-10-CM

## 2020-06-10 DIAGNOSIS — M25.579 ANKLE PAIN: ICD-10-CM

## 2020-06-10 PROCEDURE — 99283 EMERGENCY DEPT VISIT LOW MDM: CPT

## 2020-06-10 PROCEDURE — 73610 X-RAY EXAM OF ANKLE: CPT

## 2020-06-10 PROCEDURE — 96372 THER/PROPH/DIAG INJ SC/IM: CPT

## 2020-06-10 PROCEDURE — 99285 EMERGENCY DEPT VISIT HI MDM: CPT | Performed by: EMERGENCY MEDICINE

## 2020-06-10 RX ORDER — KETOROLAC TROMETHAMINE 30 MG/ML
30 INJECTION, SOLUTION INTRAMUSCULAR; INTRAVENOUS ONCE
Status: COMPLETED | OUTPATIENT
Start: 2020-06-10 | End: 2020-06-10

## 2020-06-10 RX ADMIN — KETOROLAC TROMETHAMINE 30 MG: 30 INJECTION, SOLUTION INTRAMUSCULAR at 01:12

## 2020-09-01 ENCOUNTER — HOSPITAL ENCOUNTER (EMERGENCY)
Facility: HOSPITAL | Age: 30
Discharge: HOME | End: 2020-09-01
Attending: EMERGENCY MEDICINE | Admitting: EMERGENCY MEDICINE
Payer: COMMERCIAL

## 2020-09-01 VITALS
DIASTOLIC BLOOD PRESSURE: 95 MMHG | RESPIRATION RATE: 20 BRPM | OXYGEN SATURATION: 97 % | SYSTOLIC BLOOD PRESSURE: 169 MMHG | HEART RATE: 76 BPM | TEMPERATURE: 98.1 F

## 2020-09-01 DIAGNOSIS — M54.9 MID BACK PAIN ON LEFT SIDE: Primary | ICD-10-CM

## 2020-09-01 LAB
BILIRUB UR QL STRIP: NEGATIVE
CLARITY UR: CLEAR
COLOR UR: YELLOW
GLUCOSE SERPL-MCNC: 127 MG/DL (ref 65–140)
GLUCOSE UR STRIP-MCNC: NEGATIVE MG/DL
HGB UR QL STRIP.AUTO: NEGATIVE
KETONES UR STRIP-MCNC: NEGATIVE MG/DL
LEUKOCYTE ESTERASE UR QL STRIP: NEGATIVE
NITRITE UR QL STRIP: NEGATIVE
PH UR STRIP.AUTO: 6 [PH]
PROT UR STRIP-MCNC: NEGATIVE MG/DL
SP GR UR STRIP.AUTO: >=1.03 (ref 1–1.03)
UROBILINOGEN UR QL STRIP.AUTO: 1 E.U./DL

## 2020-09-01 PROCEDURE — 99283 EMERGENCY DEPT VISIT LOW MDM: CPT

## 2020-09-01 PROCEDURE — 81003 URINALYSIS AUTO W/O SCOPE: CPT | Performed by: EMERGENCY MEDICINE

## 2020-09-01 PROCEDURE — 99284 EMERGENCY DEPT VISIT MOD MDM: CPT | Performed by: EMERGENCY MEDICINE

## 2020-09-01 PROCEDURE — 82948 REAGENT STRIP/BLOOD GLUCOSE: CPT

## 2020-09-01 RX ORDER — LIDOCAINE 50 MG/G
1 PATCH TOPICAL ONCE
Status: DISCONTINUED | OUTPATIENT
Start: 2020-09-01 | End: 2020-09-02 | Stop reason: HOSPADM

## 2020-09-01 RX ORDER — OXYCODONE HYDROCHLORIDE 5 MG/1
5 CAPSULE ORAL EVERY 6 HOURS PRN
Qty: 5 CAPSULE | Refills: 0 | Status: SHIPPED | OUTPATIENT
Start: 2020-09-01 | End: 2020-09-08 | Stop reason: ALTCHOICE

## 2020-09-01 RX ORDER — OXYCODONE HYDROCHLORIDE 5 MG/1
5 TABLET ORAL ONCE
Status: COMPLETED | OUTPATIENT
Start: 2020-09-01 | End: 2020-09-01

## 2020-09-01 RX ADMIN — OXYCODONE HYDROCHLORIDE 5 MG: 5 TABLET ORAL at 21:52

## 2020-09-01 RX ADMIN — LIDOCAINE 5% 1 PATCH: 700 PATCH TOPICAL at 21:52

## 2020-09-01 NOTE — Clinical Note
Elayne Guilherme was seen and treated in our emergency department on 9/1/2020  Diagnosis:     Thomas Watkins  may return to work on return date  He may return on this date: 09/02/2020         If you have any questions or concerns, please don't hesitate to call        Jonathan Neely MD    ______________________________           _______________          _______________  Hospital Representative                              Date                                Time

## 2020-09-02 DIAGNOSIS — I10 ESSENTIAL HYPERTENSION: ICD-10-CM

## 2020-09-02 NOTE — ED PROVIDER NOTES
History  Chief Complaint   Patient presents with    Back Pain     Pt reports left low back pain that intermittently sends shooting pain down left leg  No known injury, pt reports waking up with pain  No other complaints at this time  30 yr male-  afternoon sat up in truck and got gradual onset of left mid back pain--  Initially mid into left mid flank--and down left leg- no n/v- no gu comsp-  Worse upon standing- no hx of kidney stones-- no b/b incont/ no saddle anesthesia/ no lle change in sensation/ strength       History provided by:  Patient   used: No    Back Pain       Prior to Admission Medications   Prescriptions Last Dose Informant Patient Reported? Taking? EPINEPHrine (EPIPEN) 0 3 mg/0 3 mL SOAJ   No No   Sig: Inject 0 3 mL (0 3 mg total) into a muscle once for 1 dose   amLODIPine (NORVASC) 10 mg tablet  Self No No   Sig: Take 1 tablet (10 mg total) by mouth daily   ergocalciferol (VITAMIN D2) 50,000 units   No No   Sig: Take 1 capsule (50,000 Units total) by mouth once a week   fexofenadine (ALLEGRA) 180 MG tablet  Self Yes No   Sig: Take 180 mg by mouth daily    fluticasone (FLONASE) 50 mcg/act nasal spray  Self Yes No   Si sprays into each nostril   metFORMIN (GLUCOPHAGE) 500 mg tablet   No No   Sig: TAKE 2 TABLETS BY MOUTH TWICE A DAY WITH FOOD      Facility-Administered Medications: None       Past Medical History:   Diagnosis Date    Allergies     Seasonal    Blood in stool     Cough with sputum     Diabetes mellitus ([address] )     Hypertension     Pancreatitis     Problems with hearing     Wheezing        Past Surgical History:   Procedure Laterality Date    COLONOSCOPY      [facility]HN    MOUTH SURGERY         Family History   Problem Relation Age of Onset    Hypertension Father     Diabetes Father     Meniere's disease Maternal Grandfather      I have reviewed and agree with the history as documented      E-Cigarette/Vaping    E-Cigarette Use Never User E-Cigarette/Vaping Substances    Nicotine No     THC No     CBD No     Flavoring No     Other No     Unknown No      Social History     Tobacco Use    Smoking status: Current Some Day Smoker     Packs/day: 0 25    Smokeless tobacco: Current User     Types: Chew   Substance Use Topics    Alcohol use: Yes     Frequency: 2-3 times a week     Drinks per session: 10 or more     Binge frequency: Weekly     Comment: about twice a month    Drug use: Never       Review of Systems   Constitutional: Negative  HENT: Negative  Eyes: Negative  Respiratory: Negative  Cardiovascular: Negative  Gastrointestinal: Negative  Endocrine: Negative  Genitourinary: Negative  Musculoskeletal: Positive for back pain  Negative for arthralgias, gait problem, joint swelling, myalgias, neck pain and neck stiffness  Left leg pain    Skin: Negative  Allergic/Immunologic: Negative  Neurological: Negative  Hematological: Negative  Psychiatric/Behavioral: Negative  Physical Exam  Physical Exam  Vitals signs and nursing note reviewed  Constitutional:       General: He is not in acute distress  Appearance: Normal appearance  He is obese  He is not ill-appearing, toxic-appearing or diaphoretic  Comments: avss- htn- pulse ox  97 % on ra- interpretation is normal- no intervention- well appearing lying in bed in nad    HENT:      Head: Normocephalic and atraumatic  Nose: Nose normal  No congestion or rhinorrhea  Mouth/Throat:      Mouth: Mucous membranes are moist    Eyes:      General: No scleral icterus  Right eye: No discharge  Left eye: No discharge  Extraocular Movements: Extraocular movements intact  Conjunctiva/sclera: Conjunctivae normal       Pupils: Pupils are equal, round, and reactive to light  Comments: Mm pink   Neck:      Musculoskeletal: Normal range of motion and neck supple  No neck rigidity or muscular tenderness  Vascular: No carotid bruit  Cardiovascular:      Rate and Rhythm: Normal rate and regular rhythm  Pulses: Normal pulses  Heart sounds: Normal heart sounds  No murmur  No friction rub  No gallop  Pulmonary:      Effort: Pulmonary effort is normal  No respiratory distress  Breath sounds: Normal breath sounds  No stridor  No wheezing, rhonchi or rales  Chest:      Chest wall: No tenderness  Abdominal:      General: Abdomen is flat  Bowel sounds are normal  There is no distension  Palpations: Abdomen is soft  There is no mass  Tenderness: There is no abdominal tenderness  There is left CVA tenderness  There is no right CVA tenderness, guarding or rebound  Hernia: No hernia is present  Comments: mid left lower cva tenderness to palpation -- abd soft/nt/nd- no flank tenderness-    Musculoskeletal: Normal range of motion  General: No swelling, tenderness, deformity or signs of injury  Right lower leg: No edema  Left lower leg: No edema  Comments: No pmt c/t/l/s spine- neg left slrt and xed slrt- equal bilateral radial/dp pulses- no ble edema/calf tenderness/asym/ erythema   Lymphadenopathy:      Cervical: No cervical adenopathy  Skin:     General: Skin is warm  Capillary Refill: Capillary refill takes less than 2 seconds  Coloration: Skin is not jaundiced or pale  Findings: No bruising, erythema, lesion or rash  Neurological:      General: No focal deficit present  Mental Status: He is alert and oriented to person, place, and time  Mental status is at baseline  Cranial Nerves: No cranial nerve deficit  Sensory: No sensory deficit  Motor: No weakness  Coordination: Coordination normal       Gait: Gait normal       Deep Tendon Reflexes: Reflexes normal       Comments: Normal non focal neuro exam    Psychiatric:         Mood and Affect: Mood normal          Behavior: Behavior normal          Thought Content:  Thought content normal          Judgment: Judgment normal          Vital Signs  ED Triage Vitals [09/01/20 2101]   Temperature Pulse Respirations Blood Pressure SpO2   98 1 °F (36 7 °C) 76 20 169/95 97 %      Temp Source Heart Rate Source Patient Position - Orthostatic VS BP Location FiO2 (%)   Oral Monitor Lying Right arm --      Pain Score       8           Vitals:    09/01/20 2101   BP: 169/95   Pulse: 76   Patient Position - Orthostatic VS: Lying         Visual Acuity  Visual Acuity      Most Recent Value   L Pupil Size (mm)  3   R Pupil Size (mm)  3          ED Medications  Medications   lidocaine (LIDODERM) 5 % patch 1 patch (1 patch Topical Medication Applied 9/1/20 2152)   oxyCODONE (ROXICODONE) IR tablet 5 mg (5 mg Oral Given 9/1/20 2152)       Diagnostic Studies  Results Reviewed     Procedure Component Value Units Date/Time    UA (URINE) with reflex to Scope [[id number]] Collected:  09/01/20 2157    Lab Status:  Final result Specimen:  Urine, Clean Catch Updated:  09/01/20 2226     Color, UA Yellow     Clarity, UA Clear     Specific Gravity, UA >=1 030     pH, UA 6 0     Leukocytes, UA Negative     Nitrite, UA Negative     Protein, UA Negative mg/dl      Glucose, UA Negative mg/dl      Ketones, UA Negative mg/dl      Urobilinogen, UA 1 0 E U /dl      Bilirubin, UA Negative     Blood, UA Negative    Fingerstick Glucose (POCT) [[id number]]  (Normal) Collected:  09/01/20 2152    Lab Status:  Final result Updated:  09/01/20 2158     POC Glucose 127 mg/dl                  No orders to display              Procedures  Procedures         ED Course  ED Course as of Sep 01 2248   Tue Sep 01, 2020   2232 - er md note- pt- re-evaluated- feels improved with pain- pain only upon standing-- er md clinical suspicion of kidney stone is low-- with pain down left leg-- will d/c with instructions when to return           US AUDIT      Most Recent Value   Initial Alcohol Screen: US AUDIT-C    1   How often do you have a drink containing alcohol? 3 Filed at: 09/01/2020 2143   2  How many drinks containing alcohol do you have on a typical day you are drinking? 2 Filed at: 09/01/2020 2143   3a  Male UNDER 65: How often do you have five or more drinks on one occasion? 0 Filed at: 09/01/2020 2143   3b  FEMALE Any Age, or MALE 65+: How often do you have 4 or more drinks on one occassion? 0 Filed at: 09/01/2020 2143   Audit-C Score  5 Filed at: 09/01/2020 2143                  BERNARD/DAST-10      Most Recent Value   How many times in the past year have you    Used an illegal drug or used a prescription medication for non-medical reasons? Never Filed at: 09/01/2020 2144                                MDM      Disposition  Final diagnoses:   Mid back pain on left side     Time reflects when diagnosis was documented in both MDM as applicable and the Disposition within this note     Time User Action Codes Description Comment    9/1/2020 10:34 PM Thomas Arce Add [M54 9] Mid back pain on left side       ED Disposition     ED Disposition Condition Date/Time Comment    Discharge Stable Tue Sep 1, 2020 2234 [address] discharge to home/self care  Follow-up Information    None         Patient's Medications   Discharge Prescriptions    OXYCODONE (OXY-IR) 5 MG CAPSULE    Take 1 capsule (5 mg total) by mouth every 6 (six) hours as needed for severe pain for up to 5 doses Can substitute oxycodone tablets for capsules--      - note patient has had before with no reactionsMax Daily Amount: 20 mg       Start Date: 9/1/2020  End Date: --       Order Dose: 5 mg       Quantity: 5 capsule    Refills: 0     No discharge procedures on file      PDMP Review     None          ED Provider  Electronically Signed by           Ailin Hernández MD  09/01/20 4085

## 2020-09-02 NOTE — DISCHARGE INSTRUCTIONS
Diagnosis; left mid back pain     - activity as tolerated     - the lidocaine patch needs to  be removed in 12 hrs- can use over the counter lidocaine patches to area - can nto get warm or wet     - for pain- over the counter tylenol 500 mg every 4 hrs -- for severe pain only - only as needed- oxycodone- 1 tablet - only as last resort for pain     - please return to  the er for any worsening/ intractable pain / any pain radiating down left flank to groin with nausea/ vomiting/ possible kidney stone or any left leg/foot weakness/ numbness/ no inability to move or control your urine/stools/ any numbness of your saddle area these are severe problems with low back

## 2020-09-03 NOTE — TELEPHONE ENCOUNTER
Please let patient know I refilled metformin  He is due for a check up in the office  Please ask him to schedule

## 2020-09-04 ENCOUNTER — OFFICE VISIT (OUTPATIENT)
Dept: URGENT CARE | Facility: CLINIC | Age: 30
End: 2020-09-04
Payer: COMMERCIAL

## 2020-09-04 VITALS
HEIGHT: 71 IN | DIASTOLIC BLOOD PRESSURE: 84 MMHG | OXYGEN SATURATION: 99 % | WEIGHT: 315 LBS | RESPIRATION RATE: 20 BRPM | HEART RATE: 92 BPM | BODY MASS INDEX: 44.1 KG/M2 | TEMPERATURE: 100.3 F | SYSTOLIC BLOOD PRESSURE: 154 MMHG

## 2020-09-04 DIAGNOSIS — M54.6 ACUTE LEFT-SIDED THORACIC BACK PAIN: Primary | ICD-10-CM

## 2020-09-04 PROCEDURE — 99213 OFFICE O/P EST LOW 20 MIN: CPT | Performed by: FAMILY MEDICINE

## 2020-09-04 RX ORDER — PREDNISONE 50 MG/1
50 TABLET ORAL DAILY
Qty: 5 TABLET | Refills: 0 | Status: SHIPPED | OUTPATIENT
Start: 2020-09-04 | End: 2020-09-08 | Stop reason: ALTCHOICE

## 2020-09-04 RX ORDER — METHOCARBAMOL 750 MG/1
750 TABLET, FILM COATED ORAL EVERY 6 HOURS PRN
Qty: 20 TABLET | Refills: 0 | Status: SHIPPED | OUTPATIENT
Start: 2020-09-04 | End: 2020-09-12 | Stop reason: SDUPTHER

## 2020-09-04 NOTE — PATIENT INSTRUCTIONS
Moist heat for 20 30 minutes, every 2-3 hours  Prednisone 1 tablet daily, best to take 1st thing in the morning, with food  Muscle relaxant, methocarbamol, can be taken every 6 hours, or can be taken 1 tablet in the morning 1 in the afternoon and 2 at bedtime, max of 4 tablets daily  Follow up with PCP in 3-5 days  Proceed to  ER if symptoms worsen  Thoracic Back Strain   WHAT YOU NEED TO KNOW:   A thoracic back strain is a muscle or tendon injury in your upper or middle back  You may have pain, muscle spasms, swelling, or stiffness  The strain may be caused by a back injury, poor posture, or lifting a heavy object  Too much activity can also cause a strain  A mild strain may cause minor pain that goes away in a few days  A more severe strain may cause the muscle or tendon to tear  There is a very small chance you may need surgery to fix the tear  DISCHARGE INSTRUCTIONS:   Medicines: You may need any of the following:  · Prescription pain medicine  may be given  Ask how to take this medicine safely  Do not wait until the pain is severe to take your medicine  · NSAIDs , such as ibuprofen, help decrease swelling, pain, and fever  This medicine is available with or without a doctor's order  NSAIDs can cause stomach bleeding or kidney problems in certain people  If you take blood thinner medicine, always ask your healthcare provider if NSAIDs are safe for you  Always read the medicine label and follow directions  · Muscle relaxers  help prevent or treat spasms  · Take your medicine as directed  Contact your healthcare provider if you think your medicine is not helping or if you have side effects  Tell him or her if you are allergic to any medicine  Keep a list of the medicines, vitamins, and herbs you take  Include the amounts, and when and why you take them  Bring the list or the pill bottles to follow-up visits  Carry your medicine list with you in case of an emergency    Call 911 for any of the following:   · You have chest pain or shortness of breath  Return to the emergency department if:   · You have severe pain, or pain that spreads from your back to other areas  · You have new or increased swelling or redness in the injured area  Contact your healthcare provider if:   · You have questions or concerns about your condition or care  Follow up with your healthcare provider as directed: You may need more tests to check for other injuries or to see how your injury is healing  You may also need to see a specialist  Write down your questions so you remember to ask them during your visits  Self-care:   · Rest as directed  Move slowly and carefully  Do not lift heavy objects  · Apply ice or heat as directed  Ice decreases pain and swelling and may help decrease tissue damage  Heat helps decrease muscle spasms  Your healthcare provider may tell you to apply only ice for the first 24 hours to help reduce swelling  Apply ice or heat to the area for 20 minutes every hour, or as directed  Ask how many times to do this each day, and for how many days  · Use an elastic wrap or back brace as directed  These will help keep the injured area from moving so it can heal      · Go to physical therapy as directed  A physical therapist can teach you exercises to help strengthen your back  They can also teach you safe ways to bend and move so you do not cause more injury  Prevent another thoracic back strain:   · Lift objects carefully  Ask someone to help you lift heavy objects  If you must lift an object by yourself, do not use your back muscles to lift  Lift with your legs  · Check your posture  Keep your upper body lifted and your head up  Poor posture can cause back strain or make it worse  Adjust your position if you work in front of a computer  You may need arm or wrist supports or change the height of your chair  · Exercise as directed    Exercise can help strengthen your muscles and make you more flexible  Do not exercise or play sports when you are tired  Always warm up before you start and cool down when you finish  · Stretch your muscles as directed  Keep your muscles limber by stretching every day  Stretch before you exercise  © 2017 [employer] Information is for End User's use only and may not be sold, redistributed or otherwise used for commercial purposes  All illustrations and images included in CareNotes® are the copyrighted property of [employer]  or [employer]  The above information is an  only  It is not intended as medical advice for individual conditions or treatments  Talk to your doctor, nurse or pharmacist before following any medical regimen to see if it is safe and effective for you

## 2020-09-04 NOTE — PROGRESS NOTES
[id number]        NAME: Santo Bamberger is a 30 y o  male  : 1990    MRN: [id number]  DATE: 2020  TIME: 9:11 AM    Assessment and Plan   Acute left-sided thoracic back pain [M54 6]  1  Acute left-sided thoracic back pain  predniSONE 50 mg tablet    methocarbamol (ROBAXIN) 750 mg tablet         Patient Instructions     Moist heat for 20 30 minutes, every 2-3 hours  Prednisone 1 tablet daily, best to take 1st thing in the morning, with food  Muscle relaxant, methocarbamol, can be taken every 6 hours, or can be taken 1 tablet in the morning 1 in the afternoon and 2 at bedtime, max of 4 tablets daily  Follow up with PCP in 3-5 days  Proceed to  ER if symptoms worsen  Chief Complaint     Chief Complaint   Patient presents with    Back Pain     x several days         History of Present Illness       Patient states that back pain started on  in the afternoon before going to work  The left-sided mid back pain slowly increased when he reached up to get into his truck, and worsened while he was at work  On  he went to the emergency room where he was evaluated, rule out for kidney stones, and given pain medications/OxyContin for 2 days  Patient did not really take the pain medication because his job is working as a  any can use those medications  His only taken a couple Tylenol tablets with no relief  Back Pain   This is a new problem  The current episode started in the past 7 days  The problem occurs constantly  The problem is unchanged  The pain is present in the thoracic spine  The quality of the pain is described as aching, shooting and stabbing  The pain is moderate  The symptoms are aggravated by position, sitting, standing and twisting  Pertinent negatives include no bladder incontinence, bowel incontinence or chest pain  Risk factors include obesity  Review of Systems   Review of Systems   Constitutional: Negative      Respiratory: Negative  Cardiovascular: Negative for chest pain  Gastrointestinal: Negative for bowel incontinence  Genitourinary: Negative for bladder incontinence  Musculoskeletal: Positive for back pain           Current Medications       Current Outpatient Medications:     amLODIPine (NORVASC) 10 mg tablet, Take 1 tablet (10 mg total) by mouth daily, Disp: 90 tablet, Rfl: 1    EPINEPHrine (EPIPEN) 0 3 mg/0 3 mL SOAJ, Inject 0 3 mL (0 3 mg total) into a muscle once for 1 dose, Disp: 0 6 mL, Rfl: 3    ergocalciferol (VITAMIN D2) 50,000 units, Take 1 capsule (50,000 Units total) by mouth once a week, Disp: 12 capsule, Rfl: 0    fexofenadine (ALLEGRA) 180 MG tablet, Take 180 mg by mouth daily , Disp: , Rfl:     fluticasone (FLONASE) 50 mcg/act nasal spray, 2 sprays into each nostril, Disp: , Rfl:     metFORMIN (GLUCOPHAGE) 500 mg tablet, TAKE 2 TABLETS BY MOUTH TWICE A DAY WITH FOOD, Disp: 360 tablet, Rfl: 1    methocarbamol (ROBAXIN) 750 mg tablet, Take 1 tablet (750 mg total) by mouth every 6 (six) hours as needed for muscle spasms, Disp: 20 tablet, Rfl: 0    oxyCODONE (OXY-IR) 5 MG capsule, Take 1 capsule (5 mg total) by mouth every 6 (six) hours as needed for severe pain for up to 5 doses Can substitute oxycodone tablets for capsules--    - note patient has had before with no reactionsMax Daily Amount: 20 mg, Disp: 5 capsule, Rfl: 0    predniSONE 50 mg tablet, Take 1 tablet (50 mg total) by mouth daily for 5 days, Disp: 5 tablet, Rfl: 0    Current Allergies     Allergies as of 09/04/2020 - Reviewed 09/04/2020   Allergen Reaction Noted    Morphine Anaphylaxis 02/21/2019    Guaifenesin Hives 02/21/2019    Losartan Abdominal Pain 07/05/2019    Penicillins Rash 02/21/2019    Sulfa antibiotics Rash 02/21/2019            The following portions of the patient's history were reviewed and updated as appropriate: allergies, current medications, past family history, past medical history, past social history, past surgical history and problem list      Past Medical History:   Diagnosis Date    Allergies     Seasonal    Blood in stool     Cough with sputum     Diabetes mellitus ([address] )     Hypertension     Pancreatitis     Problems with hearing     Wheezing        Past Surgical History:   Procedure Laterality Date    COLONOSCOPY  2008    [facility]    MOUTH SURGERY         Family History   Problem Relation Age of Onset    Hypertension Father     Diabetes Father     Meniere's disease Maternal Grandfather          Medications have been verified  Objective   /84   Pulse 92   Temp 100 3 °F (37 9 °C) (Tympanic Core)   Resp 20   Ht 5' 11" (1 803 m)   Wt (!) 171 kg (378 lb)   SpO2 99%   BMI 52 72 kg/m²        Physical Exam     Physical Exam  Constitutional:       Appearance: He is well-developed  Cardiovascular:      Rate and Rhythm: Normal rate and regular rhythm  Pulmonary:      Effort: Pulmonary effort is normal       Breath sounds: Normal breath sounds  Musculoskeletal:      Thoracic back: He exhibits decreased range of motion and tenderness          Back:

## 2020-09-04 NOTE — LETTER
September 4, 2020     Patient: Kaleb Beyer   YOB: 1990   Date of Visit: 9/4/2020       To Whom it May Concern:    Madelaine Guerrero was seen in my clinic on 9/4/2020  If you have any questions or concerns, please don't hesitate to call           Sincerely,          Megan Flores DO        CC: No Recipients

## 2020-09-08 ENCOUNTER — OFFICE VISIT (OUTPATIENT)
Dept: FAMILY MEDICINE CLINIC | Facility: CLINIC | Age: 30
End: 2020-09-08
Payer: COMMERCIAL

## 2020-09-08 VITALS
BODY MASS INDEX: 44.1 KG/M2 | TEMPERATURE: 97.5 F | WEIGHT: 315 LBS | HEART RATE: 104 BPM | SYSTOLIC BLOOD PRESSURE: 150 MMHG | DIASTOLIC BLOOD PRESSURE: 80 MMHG | RESPIRATION RATE: 18 BRPM | HEIGHT: 71 IN | OXYGEN SATURATION: 96 %

## 2020-09-08 DIAGNOSIS — I10 ESSENTIAL HYPERTENSION: ICD-10-CM

## 2020-09-08 DIAGNOSIS — E55.9 VITAMIN D DEFICIENCY: ICD-10-CM

## 2020-09-08 DIAGNOSIS — E11.9 TYPE 2 DIABETES MELLITUS WITHOUT COMPLICATION, WITHOUT LONG-TERM CURRENT USE OF INSULIN (HCC): ICD-10-CM

## 2020-09-08 DIAGNOSIS — E66.01 CLASS 3 SEVERE OBESITY DUE TO EXCESS CALORIES WITH SERIOUS COMORBIDITY AND BODY MASS INDEX (BMI) OF 50.0 TO 59.9 IN ADULT (HCC): ICD-10-CM

## 2020-09-08 DIAGNOSIS — L91.0 KELOID: ICD-10-CM

## 2020-09-08 DIAGNOSIS — M54.6 ACUTE LEFT-SIDED THORACIC BACK PAIN: ICD-10-CM

## 2020-09-08 DIAGNOSIS — Z00.00 PHYSICAL EXAM: Primary | ICD-10-CM

## 2020-09-08 DIAGNOSIS — Z72.0 TOBACCO ABUSE: ICD-10-CM

## 2020-09-08 DIAGNOSIS — M54.9 MID BACK PAIN: ICD-10-CM

## 2020-09-08 LAB
CREAT UR-MCNC: 36.2 MG/DL
MICROALBUMIN UR-MCNC: <5 MG/L (ref 0–20)
MICROALBUMIN/CREAT 24H UR: <14 MG/G CREATININE (ref 0–30)
SL AMB  POCT GLUCOSE, UA: 2000
SL AMB LEUKOCYTE ESTERASE,UA: NORMAL
SL AMB POCT BILIRUBIN,UA: NORMAL
SL AMB POCT BLOOD,UA: NORMAL
SL AMB POCT CLARITY,UA: CLEAR
SL AMB POCT COLOR,UA: NORMAL
SL AMB POCT HEMOGLOBIN AIC: 8.1 (ref ?–6.5)
SL AMB POCT KETONES,UA: 40
SL AMB POCT NITRITE,UA: NORMAL
SL AMB POCT PH,UA: 6
SL AMB POCT SPECIFIC GRAVITY,UA: 1
SL AMB POCT URINE PROTEIN: NORMAL
SL AMB POCT UROBILINOGEN: 0.2

## 2020-09-08 PROCEDURE — 82570 ASSAY OF URINE CREATININE: CPT | Performed by: NURSE PRACTITIONER

## 2020-09-08 PROCEDURE — 3061F NEG MICROALBUMINURIA REV: CPT | Performed by: NURSE PRACTITIONER

## 2020-09-08 PROCEDURE — 83036 HEMOGLOBIN GLYCOSYLATED A1C: CPT | Performed by: NURSE PRACTITIONER

## 2020-09-08 PROCEDURE — 99395 PREV VISIT EST AGE 18-39: CPT | Performed by: NURSE PRACTITIONER

## 2020-09-08 PROCEDURE — 3725F SCREEN DEPRESSION PERFORMED: CPT | Performed by: NURSE PRACTITIONER

## 2020-09-08 PROCEDURE — 4004F PT TOBACCO SCREEN RCVD TLK: CPT | Performed by: NURSE PRACTITIONER

## 2020-09-08 PROCEDURE — 81003 URINALYSIS AUTO W/O SCOPE: CPT | Performed by: NURSE PRACTITIONER

## 2020-09-08 PROCEDURE — 82043 UR ALBUMIN QUANTITATIVE: CPT | Performed by: NURSE PRACTITIONER

## 2020-09-08 NOTE — PROGRESS NOTES
FAMILY PRACTICE OFFICE VISIT       NAME: Darrel Perry  AGE: 30 y o  SEX: male       : 1990        MRN: [id number]    Assessment and Plan     Problem List Items Addressed This Visit        Endocrine    Type 2 diabetes mellitus without complication, without long-term current use of insulin ([facility] )       Lab Results   Component Value Date    HGBA1C 8 1 (A) 2020     Hemoglobin A1c has increased from 5 6% in January to 8 1% today  Admits he has gained weight  Attributes this to mostly quitting smoking, food tastes better  Does not exercise, due to work schedule  Also admits he often forgets to take his morning dose of metformin  He works night shift, usually takes metformin when he wakes up at 3:30 p m  in the afternoon, and then again in the morning, just prior to going to sleep  I have recommended he adjust metformin dosing  Take the 1st dose when he wakes up at 3:30 p m  with 1st meal   He usually eats again at some point between midnight and 3:00 a m , recommend taking 2nd dose with this meal   He agrees, this may help with compliance and will try this new schedule  He will again start working on watching his diet and trying to be more active  He has met with a nutritionist in the past, he is aware how he should be eating  He has a history of pancreatitis, therefore will be limited in choosing additional medications for diabetes control  He will follow-up in 1 month  Aware he is due for an annual eye exam   Foot exam was completed today  Relevant Orders    Microalbumin / creatinine urine ratio (Completed)    POCT hemoglobin A1c (Completed)    Comprehensive metabolic panel    CBC    Lipid Panel with Direct LDL reflex       Cardiovascular and Mediastinum    Essential hypertension     Blood pressure is elevated today  150/80  Continue amlodipine  He will work on weight loss, watching diet, and increasing activity level     Will consider follow up with weight management, will check on insurance coverage for this  He will return in 1 month for recheck         Relevant Orders    Comprehensive metabolic panel    CBC    Lipid Panel with Direct LDL reflex       Other    Class 3 severe obesity due to excess calories with body mass index (BMI) of 50 0 to 59 9 in adult [facility])     Weight management referral  He would like to participate in a medical weight management program    Is not interested in surgical weight loss management at this time  Will verify with insurance coverage for this  Relevant Orders    Ambulatory referral to Weight Management    Tobacco abuse     Congratulated on reduced smoking to 1-2 cigarettes on work days, 1/2 pack per week at most    Encouraged strongly to quit smoking completely  Vitamin D deficiency     Vitamin-D level was 12 in January  He did take prescription vitamin-D, then stop taking vitamin-D supplementation and did not repeat vitamin-D level  Will repeat vitamin-D level at this time  Relevant Orders    Vitamin D 25 hydroxy      Other Visit Diagnoses     Physical exam    -  Primary  Discussed importance of good control over chronic conditions  Due for Tdap and influenza vaccine, will offer next office visit  Relevant Orders    POCT urine dip auto non-scope (Completed)    Keloid      Right elbow scar with keloid  Recommend follow up with dermatology  Relevant Orders    Ambulatory referral to Dermatology    Mid back pain    Back pain is improving on prednisone and methocarbamol  Will renew methocarbamol prescription  Do not work while taking this medication  May take before bedtime  He will call for persisting pain or worsening pain  1  Physical exam  POCT urine dip auto non-scope   2   Type 2 diabetes mellitus without complication, without long-term current use of insulin (HCC)  Microalbumin / creatinine urine ratio    POCT hemoglobin A1c    Comprehensive metabolic panel    CBC    Lipid Panel with Direct LDL reflex   3  Essential hypertension  Comprehensive metabolic panel    CBC    Lipid Panel with Direct LDL reflex   4  Class 3 severe obesity due to excess calories with serious comorbidity and body mass index (BMI) of 50 0 to 59 9 in adult [facility])  Ambulatory referral to Weight Management   5  Vitamin D deficiency  Vitamin D 25 hydroxy   6  Tobacco abuse     7  Keloid  Ambulatory referral to Dermatology   8  Mid back pain     9  Acute left-sided thoracic back pain  methocarbamol (ROBAXIN) 750 mg tablet           Chief Complaint     Chief Complaint   Patient presents with    Annual Exam    Follow-up     [facility] f/u for back pain       History of Present Illness     Kristin Fernandez is a 30year old male presenting today for annual exam     Back pain:  Started one week ago  Evaluated in the emergency room on 9/1  Was prescribed oxycodone, which he did not take  Not interested in opioid pain management  Went to Urgent care on 9/4 and prescribed prednisone and a muscle relaxer  These medications are helping a lot with pain relief  Today is his last day of prednisone and muscle relaxer prescriptions  Pain is located mid low back  No numbness or tingling in lower extremities  No known injuries  Initially had moments of pain shooting down his left leg, this has completely resolved  Has significantly reduced cigarette smoking  He does not smoke at home  Smokes 1-2 cigarettes daily at work  This is approximately half pack per week at the most     Has been drinking soda, Coke Zero  Sometimes drinks light peach tea  Admits he has gained weight since quitting smoking  Food tastes better  His fiancee is starting a weight management program   They are trying to start eating better together  He would like to participate in a weight management program, but is not sure if his insurance would cover this  He is taking his blood pressure medications daily  Always takes his metformin in the evening    Often forgets morning dose  Works night shift, and typically takes his metformin before he goes to bed in the morning after his shift  He is often so tired, he forgets to take the metformin  Due for eye exam, last eye exam was 1 year ago  Is using CPAP for obstructive sleep apnea  Does not really like it  Often takes it off unknowingly when he is sleeping  Is not exercising on a regular basis due to work schedule  He works 6-7 days per week, 6:00 p m  to 6:00 a m  He is post alternate working 3 days 1 week and 4 days the next week, however he does a lot of overtime  Review of Systems   Review of Systems   Constitutional: Negative  HENT: Negative  Eyes: Negative  Respiratory: Negative  Cardiovascular: Negative  Gastrointestinal: Negative  Endocrine: Negative  Genitourinary: Negative  Musculoskeletal: Positive for back pain  Skin: Negative  Right upper arm, scar with lump on scar  Allergic/Immunologic: Negative  Neurological: Negative  Hematological: Negative  Psychiatric/Behavioral: Negative          Active Problem List     Patient Active Problem List   Diagnosis    Ulcerative colitis ([facility] )    Class 3 severe obesity due to excess calories with body mass index (BMI) of 50 0 to 59 9 in adult [facility])    Hearing loss in left ear    Tobacco abuse    Type 2 diabetes mellitus without complication, without long-term current use of insulin (HCC)    Essential hypertension    LINA (obstructive sleep apnea)    Vitamin D deficiency    Snoring    Witnessed episode of apnea    Excessive daytime sleepiness    RLS (restless legs syndrome)    PLMD (periodic limb movement disorder)       Past Medical History:  Past Medical History:   Diagnosis Date    Allergies     Seasonal    Blood in stool     Cough with sputum     [facility]     Hypertension     Pancreatitis     Problems with hearing     Wheezing        Past Surgical History:  Past Surgical History:   Procedure Laterality Date    [facility]    MOUTH SURGERY         Family History:  Family History   Problem Relation Age of Onset    Hypertension Father     Diabetes Father     Meniere's disease Maternal Grandfather        Social History:  Social History     Socioeconomic History    Marital status: Single     Spouse name: Not on file    Number of children: Not on file    Years of education: Not on file    Highest education level: Not on file   Occupational History    Not on file   Social Needs    Financial resource strain: Not on file    Food insecurity     Worry: Not on file     Inability: Not on file    Transportation needs     Medical: Not on file     Non-medical: Not on file   Tobacco Use    Smoking status: Light Tobacco Smoker     Packs/day: 0 25    Smokeless tobacco: Current User     Types: Chew   Substance and Sexual Activity    Alcohol use: Yes     Frequency: 2-3 times a week     Drinks per session: 10 or more     Binge frequency: Weekly     Comment: about twice a month    Drug use: Never    Sexual activity: Not on file   Lifestyle    Physical activity     Days per week: Not on file     Minutes per session: Not on file    Stress: Not on file   Relationships    Social connections     Talks on phone: Not on file     Gets together: Not on file     Attends Christian service: Not on file     Active member of club or organization: Not on file     Attends meetings of clubs or organizations: Not on file     Relationship status: Not on file    Intimate partner violence     Fear of current or ex partner: Not on file     Emotionally abused: Not on file     Physically abused: Not on file     Forced sexual activity: Not on file   Other Topics Concern    Not on file   Social History Narrative    Not on file       I have reviewed the patient's medical history in detail; there are no changes to the history as noted in the electronic medical record  Objective     Vitals:    09/08/20 1537   BP: 150/80   Pulse: 104   Resp: 18   Temp: 97 5 °F (36 4 °C)   TempSrc: Temporal   SpO2: 96%   Weight: (!) 175 kg (386 lb 9 6 oz)   Height: 5' 11" (1 803 m)     Wt Readings from Last 3 Encounters:   09/08/20 (!) 175 kg (386 lb 9 6 oz)   09/04/20 (!) 171 kg (378 lb)   06/10/20 (!) 166 kg (365 lb)     Physical Exam  Vitals signs and nursing note reviewed  Constitutional:       General: He is not in acute distress  Appearance: Normal appearance  He is well-developed  He is obese  He is not ill-appearing, toxic-appearing or diaphoretic  HENT:      Head: Normocephalic and atraumatic  Right Ear: Tympanic membrane and ear canal normal       Left Ear: Tympanic membrane and ear canal normal    Eyes:      Conjunctiva/sclera: Conjunctivae normal       Pupils: Pupils are equal, round, and reactive to light  Neck:      Musculoskeletal: Normal range of motion and neck supple  Thyroid: No thyromegaly  Cardiovascular:      Rate and Rhythm: Normal rate and regular rhythm  Pulses: no weak pulses          Dorsalis pedis pulses are 2+ on the right side and 2+ on the left side  Posterior tibial pulses are 2+ on the right side and 2+ on the left side  Heart sounds: No murmur  Pulmonary:      Effort: Pulmonary effort is normal  No respiratory distress  Breath sounds: Normal breath sounds  No wheezing or rales  Abdominal:      General: Bowel sounds are normal       Palpations: Abdomen is soft  Musculoskeletal:      Thoracic back: He exhibits normal range of motion, no tenderness, no edema, no deformity and no spasm  Lumbar back: He exhibits normal range of motion, no tenderness, no edema, no deformity and no spasm  Right lower leg: No edema  Left lower leg: No edema  Feet:      Right foot:      Skin integrity: Callus (medial great toe) present  No ulcer, skin breakdown, erythema, warmth or dry skin        Left foot: Skin integrity: Callus ( medial great toe) present  No ulcer, skin breakdown, erythema, warmth or dry skin  Lymphadenopathy:      Cervical: No cervical adenopathy  Skin:     General: Skin is warm and dry  Findings: No rash  Comments: Right elbow scar with keloid formation  Neurological:      Mental Status: He is alert and oriented to person, place, and time  Psychiatric:         Mood and Affect: Mood normal          Behavior: Behavior normal          Thought Content: Thought content normal          Judgment: Judgment normal             Patient's shoes and socks removed  Right Foot/Ankle   Right Foot Inspection  Skin Exam: skin normal, skin intact, callus (medial great toe) and callus (medial great toe) no dry skin, no warmth, no erythema, no maceration, no abnormal color, no pre-ulcer and no ulcer                          Toe Exam: ROM and strength within normal limits and right toe deformity (right second toe DIP joint curved toward great toe )  Sensory     Proprioception: intact   Monofilament testing: intact  Vascular  Capillary refills: < 3 seconds  The right DP pulse is 2+  The right PT pulse is 2+  Left Foot/Ankle  Left Foot Inspection  Skin Exam: skin normal, skin intact and callus ( medial great toe)no dry skin, no warmth, no erythema, no maceration, normal color, no pre-ulcer and no ulcer                         Toe Exam: ROM and strength within normal limits and left toe deformity ( left second toe DIP joint curved toward great toe)                   Sensory     Proprioception: intact  Monofilament: intact  Vascular  Capillary refills: < 3 seconds  The left DP pulse is 2+  The left PT pulse is 2+  Assign Risk Category:  No deformity present; No loss of protective sensation;  No weak pulses       Risk: 0      ALLERGIES:  Allergies   Allergen Reactions    Morphine Anaphylaxis     Anaphylaxis    Guaifenesin Hives    Losartan Abdominal Pain     Muscle cramps    Penicillins Rash    Sulfa Antibiotics Rash       Current Medications     Current Outpatient Medications   Medication Sig Dispense Refill    amLODIPine (NORVASC) 10 mg tablet Take 1 tablet (10 mg total) by mouth daily 90 tablet 1    fexofenadine (ALLEGRA) 180 MG tablet Take 180 mg by mouth daily       fluticasone (FLONASE) 50 mcg/act nasal spray 2 sprays into each nostril      metFORMIN (GLUCOPHAGE) 500 mg tablet TAKE 2 TABLETS BY MOUTH TWICE A DAY WITH FOOD 360 tablet 1    methocarbamol (ROBAXIN) 750 mg tablet Take 1 tablet (750 mg total) by mouth every 6 (six) hours as needed for muscle spasms 20 tablet 0    EPINEPHrine (EPIPEN) 0 3 mg/0 3 mL SOAJ Inject 0 3 mL (0 3 mg total) into a muscle once for 1 dose 0 6 mL 3     No current facility-administered medications for this visit            Health Maintenance     Health Maintenance   Topic Date Due    Pneumococcal Vaccine: Pediatrics (0 to 5 Years) and At-Risk Patients (6 to 59 Years) (1 of 1 - PPSV23) 12/24/1997    DM Eye Exam  12/24/2001    HIV Screening  12/24/2006    DTaP,Tdap,and Td Vaccines (1 - Tdap) 12/24/2012    Influenza Vaccine  07/01/2020    Annual Physical  07/05/2020    Diabetic Foot Exam  08/06/2020    BMI: Followup Plan  01/13/2021    HEMOGLOBIN A1C  03/08/2021    Depression Screening PHQ  09/08/2021    BMI: Adult  09/08/2021    URINE MICROALBUMIN  09/08/2021    HIB Vaccine  Aged Out    Hepatitis B Vaccine  Aged Out    IPV Vaccine  Aged Out    Hepatitis A Vaccine  Aged Out    Meningococcal ACWY Vaccine  Aged Out    HPV Vaccine  Aged [employer] History   Administered Date(s) Administered    INFLUENZA 02/04/2013       TONIE Kate

## 2020-09-09 ENCOUNTER — TELEPHONE (OUTPATIENT)
Dept: FAMILY MEDICINE CLINIC | Facility: CLINIC | Age: 30
End: 2020-09-09

## 2020-09-09 NOTE — TELEPHONE ENCOUNTER
----- Message from [id number] sent at 9/9/2020  8:23 AM EDT -----  Please let patient know his urine sample does not show any protein  This is good, and means kidneys are filtering well

## 2020-09-12 RX ORDER — METHOCARBAMOL 750 MG/1
750 TABLET, FILM COATED ORAL EVERY 6 HOURS PRN
Qty: 20 TABLET | Refills: 0 | Status: SHIPPED | OUTPATIENT
Start: 2020-09-12 | End: 2021-03-19

## 2020-09-12 NOTE — ASSESSMENT & PLAN NOTE
Lab Results   Component Value Date    HGBA1C 8 1 (A) 09/08/2020     Hemoglobin A1c has increased from 5 6% in January to 8 1% today  Admits he has gained weight  Attributes this to mostly quitting smoking, food tastes better  Does not exercise, due to work schedule  Also admits he often forgets to take his morning dose of metformin  He works night shift, usually takes metformin when he wakes up at 3:30 p m  in the afternoon, and then again in the morning, just prior to going to sleep  I have recommended he adjust metformin dosing  Take the 1st dose when he wakes up at 3:30 p m  with 1st meal   He usually eats again at some point between midnight and 3:00 a m , recommend taking 2nd dose with this meal   He agrees, this may help with compliance and will try this new schedule  He will again start working on watching his diet and trying to be more active  He has met with a nutritionist in the past, he is aware how he should be eating  He has a history of pancreatitis, therefore will be limited in choosing additional medications for diabetes control  He will follow-up in 1 month  Aware he is due for an annual eye exam   Foot exam was completed today

## 2020-09-12 NOTE — ASSESSMENT & PLAN NOTE
Congratulated on reduced smoking to 1-2 cigarettes on work days, 1/2 pack per week at most    Encouraged strongly to quit smoking completely

## 2020-09-12 NOTE — ASSESSMENT & PLAN NOTE
Weight management referral  He would like to participate in a medical weight management program    Is not interested in surgical weight loss management at this time  Will verify with insurance coverage for this

## 2020-09-12 NOTE — ASSESSMENT & PLAN NOTE
Blood pressure is elevated today  150/80  He will work on weight loss, watching diet, and increasing activity level  Will consider follow up with weight management, will check on insurance coverage for this    He will return in 1 month for recheck

## 2020-09-12 NOTE — ASSESSMENT & PLAN NOTE
Vitamin-D level was 12 in January  He did take prescription vitamin-D, then stop taking vitamin-D supplementation and did not repeat vitamin-D level  Will repeat vitamin-D level at this time

## 2020-10-07 DIAGNOSIS — I10 HYPERTENSION, UNSPECIFIED TYPE: ICD-10-CM

## 2020-10-08 DIAGNOSIS — I10 HYPERTENSION, UNSPECIFIED TYPE: ICD-10-CM

## 2020-10-08 RX ORDER — AMLODIPINE BESYLATE 10 MG/1
TABLET ORAL
Qty: 90 TABLET | Refills: 0 | Status: SHIPPED | OUTPATIENT
Start: 2020-10-08 | End: 2020-12-29

## 2020-10-08 RX ORDER — AMLODIPINE BESYLATE 10 MG/1
10 TABLET ORAL DAILY
Qty: 90 TABLET | Refills: 0 | OUTPATIENT
Start: 2020-10-08

## 2020-10-18 ENCOUNTER — HOSPITAL ENCOUNTER (EMERGENCY)
Facility: HOSPITAL | Age: 30
Discharge: HOME | End: 2020-10-18
Attending: EMERGENCY MEDICINE
Payer: COMMERCIAL

## 2020-10-18 VITALS
HEART RATE: 101 BPM | WEIGHT: 315 LBS | BODY MASS INDEX: 54.79 KG/M2 | TEMPERATURE: 98.6 F | RESPIRATION RATE: 20 BRPM | DIASTOLIC BLOOD PRESSURE: 106 MMHG | OXYGEN SATURATION: 96 % | SYSTOLIC BLOOD PRESSURE: 167 MMHG

## 2020-10-18 DIAGNOSIS — M54.50 LOW BACK PAIN: Primary | ICD-10-CM

## 2020-10-18 LAB
BACTERIA UR QL AUTO: ABNORMAL /HPF
BILIRUB UR QL STRIP: NEGATIVE
CLARITY UR: CLEAR
COLOR UR: YELLOW
GLUCOSE UR STRIP-MCNC: NEGATIVE MG/DL
HGB UR QL STRIP.AUTO: ABNORMAL
KETONES UR STRIP-MCNC: NEGATIVE MG/DL
LEUKOCYTE ESTERASE UR QL STRIP: NEGATIVE
NITRITE UR QL STRIP: NEGATIVE
NON-SQ EPI CELLS URNS QL MICRO: ABNORMAL /HPF
PH UR STRIP.AUTO: 6.5 [PH] (ref 4.5–8)
PROT UR STRIP-MCNC: NEGATIVE MG/DL
RBC #/AREA URNS AUTO: ABNORMAL /HPF
SP GR UR STRIP.AUTO: 1.02 (ref 1–1.03)
UROBILINOGEN UR QL STRIP.AUTO: 1 E.U./DL
WBC #/AREA URNS AUTO: ABNORMAL /HPF

## 2020-10-18 PROCEDURE — 99283 EMERGENCY DEPT VISIT LOW MDM: CPT

## 2020-10-18 PROCEDURE — 81001 URINALYSIS AUTO W/SCOPE: CPT

## 2020-10-18 PROCEDURE — 99284 EMERGENCY DEPT VISIT MOD MDM: CPT | Performed by: EMERGENCY MEDICINE

## 2020-10-18 RX ORDER — METHOCARBAMOL 500 MG/1
500 TABLET, FILM COATED ORAL 2 TIMES DAILY
Qty: 20 TABLET | Refills: 0 | Status: SHIPPED | OUTPATIENT
Start: 2020-10-18 | End: 2020-10-20 | Stop reason: ALTCHOICE

## 2020-10-18 RX ORDER — IBUPROFEN 600 MG/1
600 TABLET ORAL ONCE
Status: COMPLETED | OUTPATIENT
Start: 2020-10-18 | End: 2020-10-18

## 2020-10-18 RX ORDER — IBUPROFEN 600 MG/1
600 TABLET ORAL EVERY 6 HOURS PRN
Qty: 28 TABLET | Refills: 0 | Status: SHIPPED | OUTPATIENT
Start: 2020-10-18 | End: 2021-03-10

## 2020-10-18 RX ADMIN — IBUPROFEN 600 MG: 600 TABLET, FILM COATED ORAL at 18:56

## 2020-10-20 ENCOUNTER — OFFICE VISIT (OUTPATIENT)
Dept: FAMILY MEDICINE CLINIC | Facility: CLINIC | Age: 30
End: 2020-10-20
Payer: COMMERCIAL

## 2020-10-20 ENCOUNTER — TRANSCRIBE ORDERS (OUTPATIENT)
Dept: LAB | Facility: CLINIC | Age: 30
End: 2020-10-20

## 2020-10-20 ENCOUNTER — LAB (OUTPATIENT)
Dept: LAB | Facility: CLINIC | Age: 30
End: 2020-10-20
Payer: COMMERCIAL

## 2020-10-20 ENCOUNTER — HOSPITAL ENCOUNTER (OUTPATIENT)
Dept: RADIOLOGY | Facility: HOSPITAL | Age: 30
Discharge: HOME | End: 2020-10-20
Payer: COMMERCIAL

## 2020-10-20 VITALS
RESPIRATION RATE: 16 BRPM | OXYGEN SATURATION: 97 % | WEIGHT: 315 LBS | DIASTOLIC BLOOD PRESSURE: 80 MMHG | SYSTOLIC BLOOD PRESSURE: 124 MMHG | HEIGHT: 71 IN | TEMPERATURE: 97.3 F | HEART RATE: 97 BPM | BODY MASS INDEX: 44.1 KG/M2

## 2020-10-20 DIAGNOSIS — D72.829 LEUKOCYTOSIS, UNSPECIFIED TYPE: ICD-10-CM

## 2020-10-20 DIAGNOSIS — E11.9 TYPE 2 DIABETES MELLITUS WITHOUT COMPLICATION, WITHOUT LONG-TERM CURRENT USE OF INSULIN (HCC): ICD-10-CM

## 2020-10-20 DIAGNOSIS — R74.01 ELEVATED ALT MEASUREMENT: ICD-10-CM

## 2020-10-20 DIAGNOSIS — I10 ESSENTIAL HYPERTENSION: ICD-10-CM

## 2020-10-20 DIAGNOSIS — M54.42 ACUTE LEFT-SIDED LOW BACK PAIN WITH LEFT-SIDED SCIATICA: ICD-10-CM

## 2020-10-20 DIAGNOSIS — E55.9 VITAMIN D DEFICIENCY: ICD-10-CM

## 2020-10-20 DIAGNOSIS — E11.9 TYPE 2 DIABETES MELLITUS WITHOUT COMPLICATION, WITHOUT LONG-TERM CURRENT USE OF INSULIN (HCC): Primary | ICD-10-CM

## 2020-10-20 LAB
25(OH)D3 SERPL-MCNC: 13.8 NG/ML (ref 30–100)
ALBUMIN SERPL BCP-MCNC: 4 G/DL (ref 3.5–5)
ALP SERPL-CCNC: 125 U/L (ref 46–116)
ALT SERPL W P-5'-P-CCNC: 163 U/L (ref 12–78)
ANION GAP SERPL CALCULATED.3IONS-SCNC: 6 MMOL/L (ref 4–13)
AST SERPL W P-5'-P-CCNC: 53 U/L (ref 5–45)
BASOPHILS # BLD AUTO: 0.07 THOUSANDS/ΜL (ref 0–0.1)
BASOPHILS NFR BLD AUTO: 1 % (ref 0–1)
BILIRUB DIRECT SERPL-MCNC: 0.19 MG/DL (ref 0–0.2)
BILIRUB SERPL-MCNC: 0.82 MG/DL (ref 0.2–1)
BUN SERPL-MCNC: 15 MG/DL (ref 5–25)
CALCIUM SERPL-MCNC: 9.5 MG/DL (ref 8.3–10.1)
CHLORIDE SERPL-SCNC: 103 MMOL/L (ref 100–108)
CHOLEST SERPL-MCNC: 202 MG/DL (ref 50–200)
CO2 SERPL-SCNC: 28 MMOL/L (ref 21–32)
CREAT SERPL-MCNC: 1.02 MG/DL (ref 0.6–1.3)
EOSINOPHIL # BLD AUTO: 0.26 THOUSAND/ΜL (ref 0–0.61)
EOSINOPHIL NFR BLD AUTO: 2 % (ref 0–6)
ERYTHROCYTE [DISTWIDTH] IN BLOOD BY AUTOMATED COUNT: 13.1 % (ref 11.6–15.1)
EST. AVERAGE GLUCOSE BLD GHB EST-MCNC: 200 MG/DL
FERRITIN SERPL-MCNC: 295 NG/ML (ref 8–388)
GFR SERPL CREATININE-BSD FRML MDRD: 100 ML/MIN/1.73SQ M
GLUCOSE P FAST SERPL-MCNC: 192 MG/DL (ref 65–99)
HBA1C MFR BLD: 8.6 %
HCT VFR BLD AUTO: 43.3 % (ref 36.5–49.3)
HDLC SERPL-MCNC: 34 MG/DL
HGB BLD-MCNC: 14.3 G/DL (ref 12–17)
IMM GRANULOCYTES # BLD AUTO: 0.1 THOUSAND/UL (ref 0–0.2)
IMM GRANULOCYTES NFR BLD AUTO: 1 % (ref 0–2)
IRON SATN MFR SERPL: 23 %
IRON SERPL-MCNC: 87 UG/DL (ref 65–175)
LDLC SERPL CALC-MCNC: 135 MG/DL (ref 0–100)
LYMPHOCYTES # BLD AUTO: 3 THOUSANDS/ΜL (ref 0.6–4.47)
LYMPHOCYTES NFR BLD AUTO: 27 % (ref 14–44)
MCH RBC QN AUTO: 29.4 PG (ref 26.8–34.3)
MCHC RBC AUTO-ENTMCNC: 33 G/DL (ref 31.4–37.4)
MCV RBC AUTO: 89 FL (ref 82–98)
MONOCYTES # BLD AUTO: 0.84 THOUSAND/ΜL (ref 0.17–1.22)
MONOCYTES NFR BLD AUTO: 8 % (ref 4–12)
NEUTROPHILS # BLD AUTO: 6.98 THOUSANDS/ΜL (ref 1.85–7.62)
NEUTS SEG NFR BLD AUTO: 61 % (ref 43–75)
NONHDLC SERPL-MCNC: 168 MG/DL
NRBC BLD AUTO-RTO: 0 /100 WBCS
PLATELET # BLD AUTO: 275 THOUSANDS/UL (ref 149–390)
PMV BLD AUTO: 10.7 FL (ref 8.9–12.7)
POTASSIUM SERPL-SCNC: 4.3 MMOL/L (ref 3.5–5.3)
PROT SERPL-MCNC: 7.7 G/DL (ref 6.4–8.2)
RBC # BLD AUTO: 4.86 MILLION/UL (ref 3.88–5.62)
SL AMB POCT HEMOGLOBIN AIC: 8.2 (ref ?–6.5)
SODIUM SERPL-SCNC: 137 MMOL/L (ref 136–145)
TIBC SERPL-MCNC: 373 UG/DL (ref 250–450)
TRIGL SERPL-MCNC: 166 MG/DL
TSH SERPL DL<=0.05 MIU/L-ACNC: 3.53 UIU/ML (ref 0.36–3.74)
WBC # BLD AUTO: 11.25 THOUSAND/UL (ref 4.31–10.16)

## 2020-10-20 PROCEDURE — 36416 COLLJ CAPILLARY BLOOD SPEC: CPT | Performed by: NURSE PRACTITIONER

## 2020-10-20 PROCEDURE — 83540 ASSAY OF IRON: CPT

## 2020-10-20 PROCEDURE — 72110 X-RAY EXAM L-2 SPINE 4/>VWS: CPT

## 2020-10-20 PROCEDURE — 83550 IRON BINDING TEST: CPT

## 2020-10-20 PROCEDURE — 36415 COLL VENOUS BLD VENIPUNCTURE: CPT

## 2020-10-20 PROCEDURE — 3052F HG A1C>EQUAL 8.0%<EQUAL 9.0%: CPT | Performed by: NURSE PRACTITIONER

## 2020-10-20 PROCEDURE — 85025 COMPLETE CBC W/AUTO DIFF WBC: CPT

## 2020-10-20 PROCEDURE — 82728 ASSAY OF FERRITIN: CPT

## 2020-10-20 PROCEDURE — 99213 OFFICE O/P EST LOW 20 MIN: CPT | Performed by: NURSE PRACTITIONER

## 2020-10-20 PROCEDURE — 80053 COMPREHEN METABOLIC PANEL: CPT

## 2020-10-20 PROCEDURE — 82248 BILIRUBIN DIRECT: CPT

## 2020-10-20 PROCEDURE — 84443 ASSAY THYROID STIM HORMONE: CPT

## 2020-10-20 PROCEDURE — 80061 LIPID PANEL: CPT

## 2020-10-20 PROCEDURE — 83036 HEMOGLOBIN GLYCOSYLATED A1C: CPT

## 2020-10-20 PROCEDURE — 82306 VITAMIN D 25 HYDROXY: CPT

## 2020-10-20 PROCEDURE — 83036 HEMOGLOBIN GLYCOSYLATED A1C: CPT | Performed by: NURSE PRACTITIONER

## 2020-10-20 RX ORDER — PREDNISONE 20 MG/1
TABLET ORAL
Qty: 10 TABLET | Refills: 0 | Status: SHIPPED | OUTPATIENT
Start: 2020-10-20 | End: 2020-11-10 | Stop reason: ALTCHOICE

## 2020-10-22 ENCOUNTER — TELEPHONE (OUTPATIENT)
Dept: FAMILY MEDICINE CLINIC | Facility: CLINIC | Age: 30
End: 2020-10-22

## 2020-10-22 DIAGNOSIS — I10 ESSENTIAL HYPERTENSION: ICD-10-CM

## 2020-10-22 DIAGNOSIS — E11.9 TYPE 2 DIABETES MELLITUS WITHOUT COMPLICATION, WITHOUT LONG-TERM CURRENT USE OF INSULIN (HCC): ICD-10-CM

## 2020-10-22 DIAGNOSIS — E55.9 VITAMIN D DEFICIENCY: ICD-10-CM

## 2020-10-22 DIAGNOSIS — D72.829 LEUKOCYTOSIS, UNSPECIFIED TYPE: Primary | ICD-10-CM

## 2020-10-22 DIAGNOSIS — R74.01 TRANSAMINITIS: ICD-10-CM

## 2020-10-22 RX ORDER — ERGOCALCIFEROL 1.25 MG/1
50000 CAPSULE ORAL WEEKLY
Qty: 4 CAPSULE | Refills: 2 | Status: SHIPPED | OUTPATIENT
Start: 2020-10-22 | End: 2021-01-08

## 2020-10-23 ENCOUNTER — TELEPHONE (OUTPATIENT)
Dept: HEMATOLOGY ONCOLOGY | Facility: CLINIC | Age: 30
End: 2020-10-23

## 2020-10-27 ENCOUNTER — TELEPHONE (OUTPATIENT)
Dept: FAMILY MEDICINE CLINIC | Facility: CLINIC | Age: 30
End: 2020-10-27

## 2020-10-27 ENCOUNTER — EVALUATION (OUTPATIENT)
Dept: PHYSICAL THERAPY | Facility: REHABILITATION | Age: 30
End: 2020-10-27
Payer: COMMERCIAL

## 2020-10-27 ENCOUNTER — HOSPITAL ENCOUNTER (OUTPATIENT)
Dept: RADIOLOGY | Facility: HOSPITAL | Age: 30
Discharge: HOME | End: 2020-10-27
Payer: COMMERCIAL

## 2020-10-27 DIAGNOSIS — R74.01 TRANSAMINITIS: ICD-10-CM

## 2020-10-27 DIAGNOSIS — M54.42 ACUTE LEFT-SIDED LOW BACK PAIN WITH LEFT-SIDED SCIATICA: ICD-10-CM

## 2020-10-27 PROCEDURE — 76705 ECHO EXAM OF ABDOMEN: CPT

## 2020-10-27 PROCEDURE — 97110 THERAPEUTIC EXERCISES: CPT | Performed by: PHYSICAL THERAPIST

## 2020-10-27 PROCEDURE — 97161 PT EVAL LOW COMPLEX 20 MIN: CPT | Performed by: PHYSICAL THERAPIST

## 2020-10-28 ENCOUNTER — TELEPHONE (OUTPATIENT)
Dept: FAMILY MEDICINE CLINIC | Facility: CLINIC | Age: 30
End: 2020-10-28

## 2020-11-03 ENCOUNTER — OFFICE VISIT (OUTPATIENT)
Dept: PHYSICAL THERAPY | Facility: REHABILITATION | Age: 30
End: 2020-11-03
Payer: COMMERCIAL

## 2020-11-03 DIAGNOSIS — M54.42 ACUTE LEFT-SIDED LOW BACK PAIN WITH LEFT-SIDED SCIATICA: Primary | ICD-10-CM

## 2020-11-03 PROCEDURE — 97110 THERAPEUTIC EXERCISES: CPT

## 2020-11-03 PROCEDURE — 97112 NEUROMUSCULAR REEDUCATION: CPT

## 2020-11-10 ENCOUNTER — TELEPHONE (OUTPATIENT)
Dept: GASTROENTEROLOGY | Facility: CLINIC | Age: 30
End: 2020-11-10

## 2020-11-10 ENCOUNTER — OFFICE VISIT (OUTPATIENT)
Dept: PHYSICAL THERAPY | Facility: REHABILITATION | Age: 30
End: 2020-11-10
Payer: COMMERCIAL

## 2020-11-10 ENCOUNTER — OFFICE VISIT (OUTPATIENT)
Dept: GASTROENTEROLOGY | Facility: CLINIC | Age: 30
End: 2020-11-10
Payer: COMMERCIAL

## 2020-11-10 VITALS
TEMPERATURE: 98.4 F | BODY MASS INDEX: 44.1 KG/M2 | HEIGHT: 71 IN | DIASTOLIC BLOOD PRESSURE: 80 MMHG | WEIGHT: 315 LBS | HEART RATE: 110 BPM | SYSTOLIC BLOOD PRESSURE: 150 MMHG

## 2020-11-10 DIAGNOSIS — E66.01 CLASS 3 SEVERE OBESITY DUE TO EXCESS CALORIES WITH SERIOUS COMORBIDITY AND BODY MASS INDEX (BMI) OF 50.0 TO 59.9 IN ADULT (HCC): ICD-10-CM

## 2020-11-10 DIAGNOSIS — K21.9 GASTROESOPHAGEAL REFLUX DISEASE, UNSPECIFIED WHETHER ESOPHAGITIS PRESENT: ICD-10-CM

## 2020-11-10 DIAGNOSIS — M54.42 ACUTE LEFT-SIDED LOW BACK PAIN WITH LEFT-SIDED SCIATICA: Primary | ICD-10-CM

## 2020-11-10 DIAGNOSIS — R74.01 TRANSAMINITIS: Primary | ICD-10-CM

## 2020-11-10 PROBLEM — K51.90 ULCERATIVE COLITIS (HCC): Status: RESOLVED | Noted: 2019-02-22 | Resolved: 2020-11-10

## 2020-11-10 PROCEDURE — 99244 OFF/OP CNSLTJ NEW/EST MOD 40: CPT | Performed by: INTERNAL MEDICINE

## 2020-11-10 PROCEDURE — 97112 NEUROMUSCULAR REEDUCATION: CPT

## 2020-11-10 PROCEDURE — 97110 THERAPEUTIC EXERCISES: CPT

## 2020-11-10 RX ORDER — OMEPRAZOLE 40 MG/1
CAPSULE, DELAYED RELEASE ORAL
Qty: 30 CAPSULE | Refills: 5 | Status: SHIPPED | OUTPATIENT
Start: 2020-11-10

## 2020-11-10 RX ORDER — OMEPRAZOLE 40 MG/1
40 CAPSULE, DELAYED RELEASE ORAL DAILY
Qty: 30 CAPSULE | Refills: 5 | Status: SHIPPED | OUTPATIENT
Start: 2020-11-10 | End: 2020-11-10

## 2020-11-10 RX ORDER — OMEPRAZOLE 40 MG/1
CAPSULE, DELAYED RELEASE ORAL
Qty: 30 CAPSULE | Refills: 5 | Status: SHIPPED | OUTPATIENT
Start: 2020-11-10 | End: 2020-11-10

## 2020-11-12 ENCOUNTER — TELEPHONE (OUTPATIENT)
Dept: FAMILY MEDICINE CLINIC | Facility: CLINIC | Age: 30
End: 2020-11-12

## 2020-11-17 ENCOUNTER — OCCMED (OUTPATIENT)
Dept: URGENT CARE | Facility: CLINIC | Age: 30
End: 2020-11-17
Payer: OTHER MISCELLANEOUS

## 2020-11-17 ENCOUNTER — APPOINTMENT (OUTPATIENT)
Dept: PHYSICAL THERAPY | Facility: REHABILITATION | Age: 30
End: 2020-11-17
Payer: COMMERCIAL

## 2020-11-17 ENCOUNTER — OFFICE VISIT (OUTPATIENT)
Dept: URGENT CARE | Facility: CLINIC | Age: 30
End: 2020-11-17
Payer: COMMERCIAL

## 2020-11-17 VITALS
HEIGHT: 72 IN | DIASTOLIC BLOOD PRESSURE: 80 MMHG | HEART RATE: 100 BPM | RESPIRATION RATE: 20 BRPM | SYSTOLIC BLOOD PRESSURE: 144 MMHG | TEMPERATURE: 100 F | WEIGHT: 315 LBS | OXYGEN SATURATION: 96 % | BODY MASS INDEX: 42.66 KG/M2
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
## 2020-11-17 DIAGNOSIS — L08.9 LOCAL INFECTION OF THE SKIN AND SUBCUTANEOUS TISSUE, UNSPECIFIED: Primary | ICD-10-CM

## 2020-11-17 PROCEDURE — 99213 OFFICE O/P EST LOW 20 MIN: CPT | Performed by: PHYSICIAN ASSISTANT

## 2020-11-17 PROCEDURE — 99283 EMERGENCY DEPT VISIT LOW MDM: CPT | Performed by: PHYSICIAN ASSISTANT

## 2020-11-17 PROCEDURE — G0382 LEV 3 HOSP TYPE B ED VISIT: HCPCS | Performed by: PHYSICIAN ASSISTANT

## 2020-11-17 RX ORDER — DOXYCYCLINE HYCLATE 100 MG/1
100 CAPSULE ORAL EVERY 12 HOURS SCHEDULED
Qty: 14 CAPSULE | Refills: 0 | Status: SHIPPED | OUTPATIENT
Start: 2020-11-17 | End: 2020-11-24

## 2020-11-24 ENCOUNTER — TRANSCRIBE ORDERS (OUTPATIENT)
Dept: LAB | Facility: CLINIC | Age: 30
End: 2020-11-24

## 2020-11-24 ENCOUNTER — OFFICE VISIT (OUTPATIENT)
Dept: PHYSICAL THERAPY | Facility: REHABILITATION | Age: 30
End: 2020-11-24
Payer: COMMERCIAL

## 2020-11-24 ENCOUNTER — OFFICE VISIT (OUTPATIENT)
Dept: FAMILY MEDICINE CLINIC | Facility: CLINIC | Age: 30
End: 2020-11-24
Payer: COMMERCIAL

## 2020-11-24 ENCOUNTER — LAB (OUTPATIENT)
Dept: LAB | Facility: CLINIC | Age: 30
End: 2020-11-24
Payer: COMMERCIAL

## 2020-11-24 VITALS
DIASTOLIC BLOOD PRESSURE: 90 MMHG | BODY MASS INDEX: 44.1 KG/M2 | OXYGEN SATURATION: 99 % | TEMPERATURE: 97.8 F | HEIGHT: 71 IN | SYSTOLIC BLOOD PRESSURE: 142 MMHG | HEART RATE: 96 BPM | WEIGHT: 315 LBS

## 2020-11-24 DIAGNOSIS — R74.01 TRANSAMINITIS: ICD-10-CM

## 2020-11-24 DIAGNOSIS — E11.9 TYPE 2 DIABETES MELLITUS WITHOUT COMPLICATION, WITHOUT LONG-TERM CURRENT USE OF INSULIN (HCC): ICD-10-CM

## 2020-11-24 DIAGNOSIS — I10 ESSENTIAL HYPERTENSION: ICD-10-CM

## 2020-11-24 DIAGNOSIS — E11.9 TYPE 2 DIABETES MELLITUS WITHOUT COMPLICATION, WITHOUT LONG-TERM CURRENT USE OF INSULIN (HCC): Primary | ICD-10-CM

## 2020-11-24 DIAGNOSIS — M54.42 ACUTE LEFT-SIDED LOW BACK PAIN WITH LEFT-SIDED SCIATICA: Primary | ICD-10-CM

## 2020-11-24 DIAGNOSIS — E55.9 VITAMIN D DEFICIENCY: ICD-10-CM

## 2020-11-24 DIAGNOSIS — E66.01 CLASS 3 SEVERE OBESITY DUE TO EXCESS CALORIES WITH SERIOUS COMORBIDITY AND BODY MASS INDEX (BMI) OF 50.0 TO 59.9 IN ADULT (HCC): ICD-10-CM

## 2020-11-24 LAB
25(OH)D3 SERPL-MCNC: 23.9 NG/ML (ref 30–100)
ALBUMIN SERPL BCP-MCNC: 3.8 G/DL (ref 3.5–5)
ALP SERPL-CCNC: 121 U/L (ref 46–116)
ALT SERPL W P-5'-P-CCNC: 223 U/L (ref 12–78)
ANION GAP SERPL CALCULATED.3IONS-SCNC: 6 MMOL/L (ref 4–13)
AST SERPL W P-5'-P-CCNC: 79 U/L (ref 5–45)
BASOPHILS # BLD AUTO: 0.11 THOUSANDS/ΜL (ref 0–0.1)
BASOPHILS NFR BLD AUTO: 1 % (ref 0–1)
BILIRUB DIRECT SERPL-MCNC: 0.19 MG/DL (ref 0–0.2)
BILIRUB SERPL-MCNC: 0.64 MG/DL (ref 0.2–1)
BUN SERPL-MCNC: 11 MG/DL (ref 5–25)
CALCIUM SERPL-MCNC: 9.2 MG/DL (ref 8.3–10.1)
CHLORIDE SERPL-SCNC: 105 MMOL/L (ref 100–108)
CHOLEST SERPL-MCNC: 185 MG/DL (ref 50–200)
CO2 SERPL-SCNC: 26 MMOL/L (ref 21–32)
CREAT SERPL-MCNC: 0.8 MG/DL (ref 0.6–1.3)
EOSINOPHIL # BLD AUTO: 0.31 THOUSAND/ΜL (ref 0–0.61)
EOSINOPHIL NFR BLD AUTO: 2 % (ref 0–6)
ERYTHROCYTE [DISTWIDTH] IN BLOOD BY AUTOMATED COUNT: 12.7 % (ref 11.6–15.1)
EST. AVERAGE GLUCOSE BLD GHB EST-MCNC: 226 MG/DL
GFR SERPL CREATININE-BSD FRML MDRD: 122 ML/MIN/1.73SQ M
GLUCOSE P FAST SERPL-MCNC: 177 MG/DL (ref 65–99)
HBA1C MFR BLD: 9.5 %
HBV CORE AB SER QL: NORMAL
HBV CORE IGM SER QL: NORMAL
HBV SURFACE AG SER QL: NORMAL
HCT VFR BLD AUTO: 43.9 % (ref 36.5–49.3)
HCV AB SER QL: NORMAL
HDLC SERPL-MCNC: 38 MG/DL
HGB BLD-MCNC: 14.6 G/DL (ref 12–17)
IGA SERPL-MCNC: 237 MG/DL (ref 70–400)
IGG SERPL-MCNC: 696 MG/DL (ref 700–1600)
IGM SERPL-MCNC: 31 MG/DL (ref 40–230)
IMM GRANULOCYTES # BLD AUTO: 0.08 THOUSAND/UL (ref 0–0.2)
IMM GRANULOCYTES NFR BLD AUTO: 1 % (ref 0–2)
LDLC SERPL CALC-MCNC: 112 MG/DL (ref 0–100)
LYMPHOCYTES # BLD AUTO: 2.94 THOUSANDS/ΜL (ref 0.6–4.47)
LYMPHOCYTES NFR BLD AUTO: 23 % (ref 14–44)
MCH RBC QN AUTO: 28.7 PG (ref 26.8–34.3)
MCHC RBC AUTO-ENTMCNC: 33.3 G/DL (ref 31.4–37.4)
MCV RBC AUTO: 86 FL (ref 82–98)
MONOCYTES # BLD AUTO: 0.66 THOUSAND/ΜL (ref 0.17–1.22)
MONOCYTES NFR BLD AUTO: 5 % (ref 4–12)
NEUTROPHILS # BLD AUTO: 8.65 THOUSANDS/ΜL (ref 1.85–7.62)
NEUTS SEG NFR BLD AUTO: 68 % (ref 43–75)
NRBC BLD AUTO-RTO: 0 /100 WBCS
PLATELET # BLD AUTO: 308 THOUSANDS/UL (ref 149–390)
PMV BLD AUTO: 10.5 FL (ref 8.9–12.7)
POTASSIUM SERPL-SCNC: 4.1 MMOL/L (ref 3.5–5.3)
PROT SERPL-MCNC: 7.3 G/DL (ref 6.4–8.2)
RBC # BLD AUTO: 5.09 MILLION/UL (ref 3.88–5.62)
SL AMB POCT HEMOGLOBIN AIC: 9.4 (ref ?–6.5)
SODIUM SERPL-SCNC: 137 MMOL/L (ref 136–145)
TRIGL SERPL-MCNC: 175 MG/DL
TSH SERPL DL<=0.05 MIU/L-ACNC: 2.49 UIU/ML (ref 0.36–3.74)
WBC # BLD AUTO: 12.75 THOUSAND/UL (ref 4.31–10.16)

## 2020-11-24 PROCEDURE — 82306 VITAMIN D 25 HYDROXY: CPT

## 2020-11-24 PROCEDURE — 83036 HEMOGLOBIN GLYCOSYLATED A1C: CPT | Performed by: NURSE PRACTITIONER

## 2020-11-24 PROCEDURE — 86038 ANTINUCLEAR ANTIBODIES: CPT

## 2020-11-24 PROCEDURE — 82390 ASSAY OF CERULOPLASMIN: CPT

## 2020-11-24 PROCEDURE — 86704 HEP B CORE ANTIBODY TOTAL: CPT

## 2020-11-24 PROCEDURE — 86235 NUCLEAR ANTIGEN ANTIBODY: CPT

## 2020-11-24 PROCEDURE — 86256 FLUORESCENT ANTIBODY TITER: CPT

## 2020-11-24 PROCEDURE — 86705 HEP B CORE ANTIBODY IGM: CPT

## 2020-11-24 PROCEDURE — 3046F HEMOGLOBIN A1C LEVEL >9.0%: CPT | Performed by: NURSE PRACTITIONER

## 2020-11-24 PROCEDURE — 80061 LIPID PANEL: CPT

## 2020-11-24 PROCEDURE — 84443 ASSAY THYROID STIM HORMONE: CPT

## 2020-11-24 PROCEDURE — 97110 THERAPEUTIC EXERCISES: CPT

## 2020-11-24 PROCEDURE — 87340 HEPATITIS B SURFACE AG IA: CPT

## 2020-11-24 PROCEDURE — 36415 COLL VENOUS BLD VENIPUNCTURE: CPT

## 2020-11-24 PROCEDURE — 3008F BODY MASS INDEX DOCD: CPT | Performed by: NURSE PRACTITIONER

## 2020-11-24 PROCEDURE — 80053 COMPREHEN METABOLIC PANEL: CPT

## 2020-11-24 PROCEDURE — 83036 HEMOGLOBIN GLYCOSYLATED A1C: CPT

## 2020-11-24 PROCEDURE — 36416 COLLJ CAPILLARY BLOOD SPEC: CPT | Performed by: NURSE PRACTITIONER

## 2020-11-24 PROCEDURE — 82784 ASSAY IGA/IGD/IGG/IGM EACH: CPT

## 2020-11-24 PROCEDURE — 85025 COMPLETE CBC W/AUTO DIFF WBC: CPT

## 2020-11-24 PROCEDURE — 99214 OFFICE O/P EST MOD 30 MIN: CPT | Performed by: NURSE PRACTITIONER

## 2020-11-24 PROCEDURE — 83516 IMMUNOASSAY NONANTIBODY: CPT

## 2020-11-24 PROCEDURE — 86803 HEPATITIS C AB TEST: CPT

## 2020-11-24 PROCEDURE — 82248 BILIRUBIN DIRECT: CPT

## 2020-11-24 PROCEDURE — 97140 MANUAL THERAPY 1/> REGIONS: CPT

## 2020-11-24 PROCEDURE — 86255 FLUORESCENT ANTIBODY SCREEN: CPT

## 2020-11-24 PROCEDURE — 82103 ALPHA-1-ANTITRYPSIN TOTAL: CPT

## 2020-11-24 PROCEDURE — 4010F ACE/ARB THERAPY RXD/TAKEN: CPT | Performed by: NURSE PRACTITIONER

## 2020-11-24 RX ORDER — LISINOPRIL 10 MG/1
10 TABLET ORAL DAILY
Qty: 30 TABLET | Refills: 5 | Status: SHIPPED | OUTPATIENT
Start: 2020-11-24

## 2020-11-25 LAB
A1AT SERPL-MCNC: 153 MG/DL (ref 95–164)
ACTIN IGG SERPL-ACNC: 5 UNITS (ref 0–19)
CERULOPLASMIN SERPL-MCNC: 28 MG/DL (ref 16–31)
ENDOMYSIUM IGA SER QL: NEGATIVE
GLIADIN PEPTIDE IGA SER-ACNC: 9 UNITS (ref 0–19)
GLIADIN PEPTIDE IGG SER-ACNC: 4 UNITS (ref 0–19)
IGA SERPL-MCNC: 259 MG/DL (ref 90–386)
MITOCHONDRIA M2 IGG SER-ACNC: <20 UNITS (ref 0–20)
RYE IGE QN: NEGATIVE
TTG IGA SER-ACNC: <2 U/ML (ref 0–3)
TTG IGG SER-ACNC: <2 U/ML (ref 0–5)

## 2020-11-27 ENCOUNTER — TELEPHONE (OUTPATIENT)
Dept: HEMATOLOGY ONCOLOGY | Facility: CLINIC | Age: 30
End: 2020-11-27

## 2020-11-27 ENCOUNTER — TELEMEDICINE (OUTPATIENT)
Dept: FAMILY MEDICINE CLINIC | Facility: CLINIC | Age: 30
End: 2020-11-27
Payer: COMMERCIAL

## 2020-11-27 DIAGNOSIS — Z20.822 EXPOSURE TO COVID-19 VIRUS: Primary | ICD-10-CM

## 2020-11-27 DIAGNOSIS — Z20.822 EXPOSURE TO COVID-19 VIRUS: ICD-10-CM

## 2020-11-27 DIAGNOSIS — B34.9 VIRAL SYNDROME: ICD-10-CM

## 2020-11-27 PROCEDURE — 99214 OFFICE O/P EST MOD 30 MIN: CPT | Performed by: NURSE PRACTITIONER

## 2020-11-27 PROCEDURE — U0003 INFECTIOUS AGENT DETECTION BY NUCLEIC ACID (DNA OR RNA); SEVERE ACUTE RESPIRATORY SYNDROME CORONAVIRUS 2 (SARS-COV-2) (CORONAVIRUS DISEASE [COVID-19]), AMPLIFIED PROBE TECHNIQUE, MAKING USE OF HIGH THROUGHPUT TECHNOLOGIES AS DESCRIBED BY CMS-2020-01-R: HCPCS | Performed by: NURSE PRACTITIONER

## 2020-11-27 PROCEDURE — 4004F PT TOBACCO SCREEN RCVD TLK: CPT | Performed by: NURSE PRACTITIONER

## 2020-11-29 LAB — SARS-COV-2 RNA SPEC QL NAA+PROBE: DETECTED

## 2020-11-30 ENCOUNTER — TELEPHONE (OUTPATIENT)
Dept: FAMILY MEDICINE CLINIC | Facility: CLINIC | Age: 30
End: 2020-11-30

## 2020-12-02 DIAGNOSIS — E78.5 HYPERLIPIDEMIA, UNSPECIFIED HYPERLIPIDEMIA TYPE: ICD-10-CM

## 2020-12-02 DIAGNOSIS — E11.9 TYPE 2 DIABETES MELLITUS WITHOUT COMPLICATION, WITHOUT LONG-TERM CURRENT USE OF INSULIN (HCC): Primary | ICD-10-CM

## 2020-12-02 DIAGNOSIS — E55.9 VITAMIN D DEFICIENCY: ICD-10-CM

## 2020-12-02 DIAGNOSIS — I10 ESSENTIAL HYPERTENSION: ICD-10-CM

## 2020-12-03 ENCOUNTER — TELEPHONE (OUTPATIENT)
Dept: FAMILY MEDICINE CLINIC | Facility: CLINIC | Age: 30
End: 2020-12-03

## 2020-12-08 ENCOUNTER — TELEPHONE (OUTPATIENT)
Dept: FAMILY MEDICINE CLINIC | Facility: CLINIC | Age: 30
End: 2020-12-08

## 2020-12-23 ENCOUNTER — ANESTHESIA EVENT (OUTPATIENT)
Dept: GASTROENTEROLOGY | Facility: HOSPITAL | Age: 30
End: 2020-12-23

## 2020-12-24 ENCOUNTER — HOSPITAL ENCOUNTER (OUTPATIENT)
Dept: GASTROENTEROLOGY | Facility: HOSPITAL | Age: 30
Setting detail: OUTPATIENT SURGERY
Discharge: HOME | End: 2020-12-24
Attending: INTERNAL MEDICINE
Payer: COMMERCIAL

## 2020-12-24 ENCOUNTER — ANESTHESIA (OUTPATIENT)
Dept: GASTROENTEROLOGY | Facility: HOSPITAL | Age: 30
End: 2020-12-24

## 2020-12-24 VITALS — HEART RATE: 99 BPM

## 2020-12-24 VITALS
DIASTOLIC BLOOD PRESSURE: 86 MMHG | OXYGEN SATURATION: 97 % | TEMPERATURE: 98.3 F | HEART RATE: 84 BPM | SYSTOLIC BLOOD PRESSURE: 135 MMHG | RESPIRATION RATE: 17 BRPM

## 2020-12-24 DIAGNOSIS — K21.9 GASTROESOPHAGEAL REFLUX DISEASE, UNSPECIFIED WHETHER ESOPHAGITIS PRESENT: ICD-10-CM

## 2020-12-24 LAB — GLUCOSE SERPL-MCNC: 133 MG/DL (ref 65–140)

## 2020-12-24 PROCEDURE — 88342 IMHCHEM/IMCYTCHM 1ST ANTB: CPT | Performed by: PATHOLOGY

## 2020-12-24 PROCEDURE — 88305 TISSUE EXAM BY PATHOLOGIST: CPT | Performed by: PATHOLOGY

## 2020-12-24 PROCEDURE — 88313 SPECIAL STAINS GROUP 2: CPT | Performed by: PATHOLOGY

## 2020-12-24 PROCEDURE — 43239 EGD BIOPSY SINGLE/MULTIPLE: CPT | Performed by: INTERNAL MEDICINE

## 2020-12-24 PROCEDURE — 82948 REAGENT STRIP/BLOOD GLUCOSE: CPT

## 2020-12-24 RX ORDER — PROPOFOL 10 MG/ML
INJECTION, EMULSION INTRAVENOUS AS NEEDED
Status: DISCONTINUED | OUTPATIENT
Start: 2020-12-24 | End: 2020-12-24

## 2020-12-24 RX ORDER — LIDOCAINE HYDROCHLORIDE 20 MG/ML
INJECTION, SOLUTION INFILTRATION; PERINEURAL AS NEEDED
Status: DISCONTINUED | OUTPATIENT
Start: 2020-12-24 | End: 2020-12-24

## 2020-12-24 RX ORDER — SODIUM CHLORIDE 9 MG/ML
125 INJECTION, SOLUTION INTRAVENOUS CONTINUOUS
Status: DISCONTINUED | OUTPATIENT
Start: 2020-12-24 | End: 2020-12-28 | Stop reason: HOSPADM

## 2020-12-24 RX ADMIN — PROPOFOL 100 MG: 10 INJECTION, EMULSION INTRAVENOUS at 07:41

## 2020-12-24 RX ADMIN — PROPOFOL 100 MG: 10 INJECTION, EMULSION INTRAVENOUS at 07:39

## 2020-12-24 RX ADMIN — PROPOFOL 50 MG: 10 INJECTION, EMULSION INTRAVENOUS at 07:37

## 2020-12-24 RX ADMIN — SODIUM CHLORIDE 125 ML/HR: 0.9 INJECTION, SOLUTION INTRAVENOUS at 07:14

## 2020-12-24 RX ADMIN — LIDOCAINE HYDROCHLORIDE 1.5 ML: 20 INJECTION, SOLUTION INFILTRATION; PERINEURAL at 07:34

## 2020-12-24 RX ADMIN — PROPOFOL 150 MG: 10 INJECTION, EMULSION INTRAVENOUS at 07:35

## 2020-12-29 DIAGNOSIS — I10 HYPERTENSION, UNSPECIFIED TYPE: ICD-10-CM

## 2020-12-29 RX ORDER — AMLODIPINE BESYLATE 10 MG/1
TABLET ORAL
Qty: 90 TABLET | Refills: 0 | Status: SHIPPED | OUTPATIENT
Start: 2020-12-29 | End: 2021-04-22

## 2020-12-31 ENCOUNTER — TELEPHONE (OUTPATIENT)
Dept: GASTROENTEROLOGY | Facility: CLINIC | Age: 30
End: 2020-12-31

## 2021-01-05 ENCOUNTER — DOCUMENTATION (OUTPATIENT)
Dept: HEMATOLOGY ONCOLOGY | Facility: MEDICAL CENTER | Age: 31
End: 2021-01-05

## 2021-01-05 NOTE — PROGRESS NOTES
Left message asking that patient call the office to confirm consult appointment with Ford Pruett on 01/06 and to screen for COVID

## 2021-01-06 ENCOUNTER — CONSULT (OUTPATIENT)
Dept: HEMATOLOGY ONCOLOGY | Facility: CLINIC | Age: 31
End: 2021-01-06
Payer: COMMERCIAL

## 2021-01-06 VITALS
DIASTOLIC BLOOD PRESSURE: 71 MMHG | WEIGHT: 315 LBS | RESPIRATION RATE: 17 BRPM | TEMPERATURE: 97.3 F | OXYGEN SATURATION: 98 % | SYSTOLIC BLOOD PRESSURE: 130 MMHG | HEART RATE: 97 BPM | BODY MASS INDEX: 44.1 KG/M2 | HEIGHT: 71 IN

## 2021-01-06 DIAGNOSIS — R16.0 HEPATOMEGALY: Primary | ICD-10-CM

## 2021-01-06 DIAGNOSIS — D72.829 LEUKOCYTOSIS, UNSPECIFIED TYPE: ICD-10-CM

## 2021-01-06 PROCEDURE — 4004F PT TOBACCO SCREEN RCVD TLK: CPT | Performed by: PHYSICIAN ASSISTANT

## 2021-01-06 PROCEDURE — 3008F BODY MASS INDEX DOCD: CPT | Performed by: PHYSICIAN ASSISTANT

## 2021-01-06 PROCEDURE — 3075F SYST BP GE 130 - 139MM HG: CPT | Performed by: PHYSICIAN ASSISTANT

## 2021-01-06 PROCEDURE — 99243 OFF/OP CNSLTJ NEW/EST LOW 30: CPT | Performed by: PHYSICIAN ASSISTANT

## 2021-01-06 PROCEDURE — 3078F DIAST BP <80 MM HG: CPT | Performed by: PHYSICIAN ASSISTANT

## 2021-01-06 NOTE — PROGRESS NOTES
Oncology Outpatient Consult Note  Raghu Garcia 35 y o  male MRN: @ Encounter: [id number]        Date:  1/6/2021      Assessment/ Plan:    1  Mild leukocytosis [id number] with normal differential, hemoglobin and platelets since at least August 2016  2  Multiple chronic conditions including obesity, hypertension, diabetes, tobacco use, sleep apnea, enlarged fatty liver  Given the stable chronicity without other hematologic abnormalities, we reviewed this is likely statistical variance or due to chronic inflammation given his multiple chronic inflammatory conditions  Following a healthy diet, quitting smoking recommended  Will investigate for substrate deficiency, inflammation  Labs and imaging studies are reviewed by ordering provider once results are available  If there are findings that need immediate attention, you will be contacted when results available  Discussing results and the implication on your healthcare is best discussed in person at your follow-up visit  HPI:  Raghu Garcia is a 35 y o  seen for initial consultation 1/6/2021 at the referral of Irene Daley accompanied by his Grant Huber regarding leukocytosis  11/24/2020 hemoglobin 14 6, MCV of 86, white blood cell count 12 75, 60% neutrophils, 23% lymphocytes, 5% monocytes, platelet count 007  Normal hepatitis panel, ceruloplasmin, celiac panel, NILAY, TSH, mitochondrial AB, alpha-1 antitrypsin  10/2020:  Iron saturation 23%, ferritin 295     1/13/2020 hemoglobin 14, MCV of 89, white blood cell count 11 23, normal differential, platelets 816    August 2016 hemoglobin 14 1, white blood cell count 11 4, 64% neutrophils, 24% lymphocytes, 6% monocytes, platelet count 399    RUQ u/s:  Hepatomegaly 19 6cm with fatty infiltration  EGD performed 12/24/2020 due to GERD  Hiatal hernia noted    There was localized nodular mucosa with erosion in the antrum ongoing use of omeprazole was recommended  Patient does have diabetes, hypertension, sleep apnea, tobacco use, obesity  Forty drinks alcohol socially approximately once per week  He has back pain off and on since helping his mother-in-law a move in August 2020  He has undergone physical therapy  Denies any dental issues  Denies any fevers, chills, sweats  Does work as a   Test Results:      Labs:   Lab Results   Component Value Date    HGB 14 6 11/24/2020    HCT 43 9 11/24/2020    MCV 86 11/24/2020     11/24/2020    WBC 12 75 (H) 11/24/2020    NRBC 0 11/24/2020     Lab Results   Component Value Date    K 4 1 11/24/2020     11/24/2020    CO2 26 11/24/2020    BUN 11 11/24/2020    CREATININE 0 80 11/24/2020    GLUF 177 (H) 11/24/2020    CALCIUM 9 2 11/24/2020    AST 79 (H) 11/24/2020     (H) 11/24/2020    ALKPHOS 121 (H) 11/24/2020    EGFR 122 11/24/2020           Imaging:   No results found  ROS: As mentioned in HPI & Interval History otherwise 14 point ROS negative        Active Problems:   Patient Active Problem List   Diagnosis    Class 3 severe obesity due to excess calories with body mass index (BMI) of 50 0 to 59 9 in adult [facility])    Hearing loss in left ear    Tobacco abuse    Type 2 diabetes mellitus without complication, without long-term current use of insulin (HCC)    Essential hypertension    LINA (obstructive sleep apnea)    Vitamin D deficiency    Snoring    Witnessed episode of apnea    Excessive daytime sleepiness    RLS (restless legs syndrome)    PLMD (periodic limb movement disorder)       Past Medical History:   Past Medical History:   Diagnosis Date    Allergies     Seasonal    Blood in stool     Cough with sputum     Diabetes mellitus ([address] )     Hypertension     Pancreatitis     Problems with hearing     Wheezing        Surgical History:   Past Surgical History:   Procedure Laterality Date    [facility]    MOUTH SURGERY Family History:    Family History   Problem Relation Age of Onset    Hypertension Father     Diabetes Father     Meniere's disease Maternal Grandfather        Cancer-related family history is not on file      Social History:   Social History     Socioeconomic History    Marital status: Single     Spouse name: Not on file    Number of children: Not on file    Years of education: Not on file    Highest education level: Not on file   Occupational History    Not on file   Social Needs    Financial resource strain: Not on file    Food insecurity     Worry: Not on file     Inability: Not on file    Transportation needs     Medical: Not on file     Non-medical: Not on file   Tobacco Use    Smoking status: Light Tobacco Smoker     Packs/day: 0 25    Smokeless tobacco: Current User     Types: Chew   Substance and Sexual Activity    Alcohol use: Yes     Frequency: 2-3 times a week     Drinks per session: 10 or more     Binge frequency: Weekly     Comment: about twice a month    Drug use: Never    Sexual activity: Not on file   Lifestyle    Physical activity     Days per week: Not on file     Minutes per session: Not on file    Stress: Not on file   Relationships    Social connections     Talks on phone: Not on file     Gets together: Not on file     Attends Denominational service: Not on file     Active member of club or organization: Not on file     Attends meetings of clubs or organizations: Not on file     Relationship status: Not on file    Intimate partner violence     Fear of current or ex partner: Not on file     Emotionally abused: Not on file     Physically abused: Not on file     Forced sexual activity: Not on file   Other Topics Concern    Not on file   Social History Narrative    Not on file       Current Medications:   Current Outpatient Medications   Medication Sig Dispense Refill    amLODIPine (NORVASC) 10 mg tablet TAKE 1 TABLET BY MOUTH EVERY DAY 90 tablet 0    EPINEPHrine (EPIPEN) 0 3 mg/0 3 mL SOAJ Inject 0 3 mL (0 3 mg total) into a muscle once for 1 dose 0 6 mL 3    ergocalciferol (VITAMIN D2) 50,000 units Take 1 capsule (50,000 Units total) by mouth once a week 4 capsule 2    fexofenadine (ALLEGRA) 180 MG tablet Take 180 mg by mouth daily       fluticasone (FLONASE) 50 mcg/act nasal spray 2 sprays into each nostril      ibuprofen (MOTRIN) 600 mg tablet Take 1 tablet (600 mg total) by mouth every 6 (six) hours as needed for mild pain for up to 7 days 28 tablet 0    lisinopril (ZESTRIL) 10 mg tablet Take 1 tablet (10 mg total) by mouth daily 30 tablet 5    metFORMIN (GLUCOPHAGE) 500 mg tablet TAKE 2 TABLETS BY MOUTH TWICE A DAY WITH FOOD 360 tablet 1    methocarbamol (ROBAXIN) 750 mg tablet Take 1 tablet (750 mg total) by mouth every 6 (six) hours as needed for muscle spasms 20 tablet 0    omeprazole (PriLOSEC) 40 MG capsule TAKE 1 CAPSULE BY MOUTH EVERY DAY 30 capsule 5     No current facility-administered medications for this visit  Allergies: Allergies   Allergen Reactions    Morphine Anaphylaxis     Anaphylaxis    Guaifenesin Hives    Losartan Abdominal Pain     Muscle cramps    Penicillins Rash    Sulfa Antibiotics Rash         Physical Exam:    There is no height or weight on file to calculate BSA  Ht Readings from Last 3 Encounters:   11/24/20 5' 11" (1 803 m)   11/17/20 6' (1 829 m)   11/10/20 5' 11" (1 803 m)       Wt Readings from Last 3 Encounters:   11/24/20 (!) 174 kg (384 lb 6 oz)   11/17/20 (!) 176 kg (388 lb)   11/10/20 (!) 175 kg (386 lb)        Temp Readings from Last 3 Encounters:   12/24/20 98 3 °F (36 8 °C) (Temporal)   11/24/20 97 8 °F (36 6 °C)   11/17/20 100 °F (37 8 °C)        BP Readings from Last 3 Encounters:   12/24/20 135/86   11/24/20 142/90   11/17/20 144/80         Pulse Readings from Last 3 Encounters:   12/24/20 99   12/24/20 84   11/24/20 96         Physical Exam    Physical Exam  Vitals signs reviewed     Constitutional:       General: He is not in acute distress  Appearance: He is well-developed  He is not diaphoretic  HENT:      Head: Normocephalic and atraumatic  Eyes:      Conjunctiva/sclera: Conjunctivae normal    Neck:      Musculoskeletal: Normal range of motion and neck supple  Trachea: No tracheal deviation  Cardiovascular:      Rate and Rhythm: Normal rate and regular rhythm  Heart sounds: No murmur  No friction rub  No gallop  Pulmonary:      Effort: Pulmonary effort is normal  No respiratory distress  Breath sounds: Normal breath sounds  No wheezing or rales  Chest:      Chest wall: No tenderness  Abdominal:      General: There is no distension  Palpations: Abdomen is soft  Tenderness: There is no abdominal tenderness  Comments: Central obesity   Musculoskeletal:      Comments: Limited range of motion of his back today   Lymphadenopathy:      Cervical: No cervical adenopathy  Skin:     General: Skin is warm and dry  Coloration: Skin is not pale  Findings: No erythema  Neurological:      Mental Status: He is alert and oriented to person, place, and time  Psychiatric:         Behavior: Behavior normal          Thought Content: Thought content normal          Judgment: Judgment normal              Emergency Contacts:     Jonathan Thomson, [phone],

## 2021-01-08 DIAGNOSIS — E55.9 VITAMIN D DEFICIENCY: ICD-10-CM

## 2021-01-08 RX ORDER — ERGOCALCIFEROL 1.25 MG/1
CAPSULE ORAL
Qty: 12 CAPSULE | Refills: 0 | Status: SHIPPED | OUTPATIENT
Start: 2021-01-08 | End: 2021-02-17 | Stop reason: SDUPTHER

## 2021-01-29 ENCOUNTER — LAB (OUTPATIENT)
Dept: LAB | Facility: CLINIC | Age: 31
End: 2021-01-29
Payer: COMMERCIAL

## 2021-01-29 DIAGNOSIS — I10 ESSENTIAL HYPERTENSION: ICD-10-CM

## 2021-01-29 DIAGNOSIS — E55.9 VITAMIN D DEFICIENCY: ICD-10-CM

## 2021-01-29 DIAGNOSIS — D72.829 LEUKOCYTOSIS, UNSPECIFIED TYPE: ICD-10-CM

## 2021-01-29 DIAGNOSIS — I10 HYPERTENSION, UNSPECIFIED TYPE: ICD-10-CM

## 2021-01-29 DIAGNOSIS — E11.9 TYPE 2 DIABETES MELLITUS WITHOUT COMPLICATION, WITHOUT LONG-TERM CURRENT USE OF INSULIN (HCC): ICD-10-CM

## 2021-01-29 DIAGNOSIS — E78.5 HYPERLIPIDEMIA, UNSPECIFIED HYPERLIPIDEMIA TYPE: ICD-10-CM

## 2021-01-29 LAB
25(OH)D3 SERPL-MCNC: 22.1 NG/ML (ref 30–100)
ALBUMIN SERPL BCP-MCNC: 4.1 G/DL (ref 3.5–5)
ALP SERPL-CCNC: 107 U/L (ref 46–116)
ALT SERPL W P-5'-P-CCNC: 167 U/L (ref 12–78)
ANION GAP SERPL CALCULATED.3IONS-SCNC: 3 MMOL/L (ref 4–13)
AST SERPL W P-5'-P-CCNC: 55 U/L (ref 5–45)
BASOPHILS # BLD AUTO: 0.08 THOUSANDS/ΜL (ref 0–0.1)
BASOPHILS NFR BLD AUTO: 1 % (ref 0–1)
BILIRUB SERPL-MCNC: 0.61 MG/DL (ref 0.2–1)
BUN SERPL-MCNC: 12 MG/DL (ref 5–25)
CALCIUM SERPL-MCNC: 9.6 MG/DL (ref 8.3–10.1)
CHLORIDE SERPL-SCNC: 107 MMOL/L (ref 100–108)
CHOLEST SERPL-MCNC: 204 MG/DL (ref 50–200)
CO2 SERPL-SCNC: 29 MMOL/L (ref 21–32)
CREAT SERPL-MCNC: 0.96 MG/DL (ref 0.6–1.3)
CRP SERPL QL: 22.2 MG/L
EOSINOPHIL # BLD AUTO: 0.35 THOUSAND/ΜL (ref 0–0.61)
EOSINOPHIL NFR BLD AUTO: 3 % (ref 0–6)
ERYTHROCYTE [DISTWIDTH] IN BLOOD BY AUTOMATED COUNT: 13.5 % (ref 11.6–15.1)
EST. AVERAGE GLUCOSE BLD GHB EST-MCNC: 166 MG/DL
FOLATE SERPL-MCNC: 8.9 NG/ML (ref 3.1–17.5)
GFR SERPL CREATININE-BSD FRML MDRD: 106 ML/MIN/1.73SQ M
GLUCOSE P FAST SERPL-MCNC: 132 MG/DL (ref 65–99)
HBA1C MFR BLD: 7.4 %
HCT VFR BLD AUTO: 45.1 % (ref 36.5–49.3)
HDLC SERPL-MCNC: 31 MG/DL
HGB BLD-MCNC: 14.8 G/DL (ref 12–17)
IMM GRANULOCYTES # BLD AUTO: 0.07 THOUSAND/UL (ref 0–0.2)
IMM GRANULOCYTES NFR BLD AUTO: 1 % (ref 0–2)
LDLC SERPL CALC-MCNC: 141 MG/DL (ref 0–100)
LYMPHOCYTES # BLD AUTO: 3.07 THOUSANDS/ΜL (ref 0.6–4.47)
LYMPHOCYTES NFR BLD AUTO: 24 % (ref 14–44)
MCH RBC QN AUTO: 29.2 PG (ref 26.8–34.3)
MCHC RBC AUTO-ENTMCNC: 32.8 G/DL (ref 31.4–37.4)
MCV RBC AUTO: 89 FL (ref 82–98)
MONOCYTES # BLD AUTO: 0.69 THOUSAND/ΜL (ref 0.17–1.22)
MONOCYTES NFR BLD AUTO: 5 % (ref 4–12)
NEUTROPHILS # BLD AUTO: 8.51 THOUSANDS/ΜL (ref 1.85–7.62)
NEUTS SEG NFR BLD AUTO: 66 % (ref 43–75)
NRBC BLD AUTO-RTO: 0 /100 WBCS
PLATELET # BLD AUTO: 284 THOUSANDS/UL (ref 149–390)
PMV BLD AUTO: 11 FL (ref 8.9–12.7)
POTASSIUM SERPL-SCNC: 3.8 MMOL/L (ref 3.5–5.3)
PROT SERPL-MCNC: 7.5 G/DL (ref 6.4–8.2)
RBC # BLD AUTO: 5.07 MILLION/UL (ref 3.88–5.62)
SODIUM SERPL-SCNC: 139 MMOL/L (ref 136–145)
TRIGL SERPL-MCNC: 162 MG/DL
TSH SERPL DL<=0.05 MIU/L-ACNC: 3.04 UIU/ML (ref 0.36–3.74)
WBC # BLD AUTO: 12.77 THOUSAND/UL (ref 4.31–10.16)

## 2021-01-29 PROCEDURE — 82746 ASSAY OF FOLIC ACID SERUM: CPT

## 2021-01-29 PROCEDURE — 85025 COMPLETE CBC W/AUTO DIFF WBC: CPT | Performed by: PHYSICIAN ASSISTANT

## 2021-01-29 PROCEDURE — 83036 HEMOGLOBIN GLYCOSYLATED A1C: CPT

## 2021-01-29 PROCEDURE — 84443 ASSAY THYROID STIM HORMONE: CPT

## 2021-01-29 PROCEDURE — 86140 C-REACTIVE PROTEIN: CPT

## 2021-01-29 PROCEDURE — 80061 LIPID PANEL: CPT

## 2021-01-29 PROCEDURE — 82306 VITAMIN D 25 HYDROXY: CPT

## 2021-01-29 PROCEDURE — 80053 COMPREHEN METABOLIC PANEL: CPT

## 2021-01-29 PROCEDURE — 3051F HG A1C>EQUAL 7.0%<8.0%: CPT | Performed by: PHYSICIAN ASSISTANT

## 2021-01-29 PROCEDURE — 36415 COLL VENOUS BLD VENIPUNCTURE: CPT | Performed by: PHYSICIAN ASSISTANT

## 2021-01-29 RX ORDER — AMLODIPINE BESYLATE 10 MG/1
10 TABLET ORAL DAILY
Qty: 90 TABLET | Refills: 0 | Status: CANCELLED | OUTPATIENT
Start: 2021-01-29

## 2021-01-29 NOTE — TELEPHONE ENCOUNTER
Patient is requesting a refill  Requested Prescriptions     Pending Prescriptions Disp Refills    amLODIPine (NORVASC) 10 mg tablet 90 tablet 0     Sig: Take 1 tablet (10 mg total) by mouth daily       Please review and advise  From the looks of patients chart, patient will not need a refill until March  Called patient, no answer     Unable to leave VM

## 2021-01-29 NOTE — TELEPHONE ENCOUNTER
Relayed providers message to patient  Advised a 90 day refill was sent to [facility] on 12/29  Patient calling pharmacy

## 2021-02-03 ENCOUNTER — TELEPHONE (OUTPATIENT)
Dept: FAMILY MEDICINE CLINIC | Facility: CLINIC | Age: 31
End: 2021-02-03

## 2021-02-03 NOTE — TELEPHONE ENCOUNTER
Called patient, no answer     Left a detailed message advising patient to contact office for providers result note and instructions

## 2021-02-03 NOTE — TELEPHONE ENCOUNTER
----- Message from Niraj Brito sent at 2/2/2021  9:50 PM EST -----  Hemoglobin A1c is improved at 7 4%, but still not at goal   Vitamin-D level is still low  Cholesterol is high  Please ask patient to schedule a follow-up visit to discuss blood work in detail

## 2021-02-04 NOTE — TELEPHONE ENCOUNTER
Patient is scheduled  Patient made aware of result note and providers instructions  Patient verbalized understanding

## 2021-02-09 ENCOUNTER — DOCUMENTATION (OUTPATIENT)
Dept: HEMATOLOGY ONCOLOGY | Facility: MEDICAL CENTER | Age: 31
End: 2021-02-09

## 2021-02-09 ENCOUNTER — TELEPHONE (OUTPATIENT)
Dept: HEMATOLOGY ONCOLOGY | Facility: CLINIC | Age: 31
End: 2021-02-09

## 2021-02-09 NOTE — TELEPHONE ENCOUNTER
Reschedule Appointment     Who is calling in Patient    Doctor Appointment Scheduled with Rico Cheek date and time 02/10/2021 @ 9:00am    New date and time 03/10/2021 @ 11:30am    Location [place]    Patient verbalized understanding

## 2021-02-17 ENCOUNTER — OFFICE VISIT (OUTPATIENT)
Dept: FAMILY MEDICINE CLINIC | Facility: CLINIC | Age: 31
End: 2021-02-17
Payer: COMMERCIAL

## 2021-02-17 VITALS
HEIGHT: 71 IN | SYSTOLIC BLOOD PRESSURE: 130 MMHG | WEIGHT: 315 LBS | OXYGEN SATURATION: 98 % | TEMPERATURE: 98.1 F | RESPIRATION RATE: 16 BRPM | DIASTOLIC BLOOD PRESSURE: 78 MMHG | HEART RATE: 93 BPM | BODY MASS INDEX: 44.1 KG/M2

## 2021-02-17 DIAGNOSIS — K44.9 HIATAL HERNIA: ICD-10-CM

## 2021-02-17 DIAGNOSIS — E11.9 TYPE 2 DIABETES MELLITUS WITHOUT COMPLICATION, WITHOUT LONG-TERM CURRENT USE OF INSULIN (HCC): ICD-10-CM

## 2021-02-17 DIAGNOSIS — G89.29 CHRONIC MIDLINE LOW BACK PAIN WITHOUT SCIATICA: Primary | ICD-10-CM

## 2021-02-17 DIAGNOSIS — E66.01 CLASS 3 SEVERE OBESITY DUE TO EXCESS CALORIES WITH SERIOUS COMORBIDITY AND BODY MASS INDEX (BMI) OF 50.0 TO 59.9 IN ADULT (HCC): ICD-10-CM

## 2021-02-17 DIAGNOSIS — R79.82 ELEVATED C-REACTIVE PROTEIN (CRP): ICD-10-CM

## 2021-02-17 DIAGNOSIS — M54.50 CHRONIC MIDLINE LOW BACK PAIN WITHOUT SCIATICA: Primary | ICD-10-CM

## 2021-02-17 DIAGNOSIS — I10 ESSENTIAL HYPERTENSION: ICD-10-CM

## 2021-02-17 DIAGNOSIS — E55.9 VITAMIN D DEFICIENCY: ICD-10-CM

## 2021-02-17 DIAGNOSIS — R79.89 ELEVATED LFTS: ICD-10-CM

## 2021-02-17 DIAGNOSIS — G47.33 OSA (OBSTRUCTIVE SLEEP APNEA): ICD-10-CM

## 2021-02-17 DIAGNOSIS — E78.2 MIXED HYPERLIPIDEMIA: ICD-10-CM

## 2021-02-17 DIAGNOSIS — D72.829 LEUKOCYTOSIS, UNSPECIFIED TYPE: ICD-10-CM

## 2021-02-17 PROCEDURE — 99214 OFFICE O/P EST MOD 30 MIN: CPT | Performed by: NURSE PRACTITIONER

## 2021-02-17 RX ORDER — ERGOCALCIFEROL 1.25 MG/1
50000 CAPSULE ORAL WEEKLY
Qty: 4 CAPSULE | Refills: 5 | Status: SHIPPED | OUTPATIENT
Start: 2021-02-17

## 2021-02-17 NOTE — PROGRESS NOTES
FAMILY PRACTICE OFFICE VISIT       NAME: Chicho Perry  AGE: 35 y o  SEX: male       : 1990        MRN: [id number]    Assessment and Plan     Problem List Items Addressed This Visit        Endocrine    Type 2 diabetes mellitus without complication, without long-term current use of insulin ([facility] )       Lab Results   Component Value Date    HGBA1C 7 4 (H) 2021     A1c has improved from 9 4% to 7 4% with lifestyle modifications  Continue Metformin  Continue lifestyle modifications  Continue to work on weight loss  Foot exam is up-to-date  Aware he is due for eye exam   He will return to the office in 3-4 months for checkup  Relevant Orders    Hemoglobin A1C       Respiratory    LINA (obstructive sleep apnea)     Using CPAP sporadically  Works night shift, 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  Sometimes falls asleep on couch  Aware he needs to use CPAP more regularly  Cardiovascular and Mediastinum    Essential hypertension       Blood pressure is borderline 130/78  Blood pressure goal less than 130/80  Will continue amlodipine and lisinopril  He is working on [employer], weight loss  I have referred him to weight management  Relevant Orders    CBC    Comprehensive metabolic panel    TSH, 3rd generation       Other    Class 3 severe obesity due to excess calories with body mass index (BMI) of 50 0 to 59 9 in adult ([facility] )     BMI Counseling: Body mass index is 52 66 kg/m²  The BMI is above normal  Patient referred to weight management due to patient being morbidly obese  Relevant Orders    Ambulatory referral to Weight Management    Vitamin D deficiency     Will continue vitamin D2 65902 units weekly  Will repeat vitamin-D level in 3-4 months  Relevant Medications    ergocalciferol (VITAMIN D2) 50,000 units    Other Relevant Orders    Vitamin D 25 hydroxy    Leukocytosis       Following with Hematology           Elevated LFTs     LFT's improving, following with gastroenterology  Will continue to monitor  Hiatal hernia      Held if hernia noted on recent EGD  Currently taking omeprazole 40 mg daily  This helps significantly with GERD symptoms  Mixed hyperlipidemia     , this is increased since last check at 112    he will continue to work on lifestyle modifications  Relevant Orders    Lipid panel      Other Visit Diagnoses     Chronic midline low back pain without sciatica    -  Primary  Has tried PT, muscle relaxant, NSAIDS, with only short term improvement  He understands weight loss would be beneficial for improvement in pain  Relevant Orders    Ambulatory referral to Pain Management    Elevated C-reactive protein (CRP)      CRP elevated, will repeat  May be elevated due to obesity  Relevant Orders    C-reactive protein            1  Chronic midline low back pain without sciatica  Ambulatory referral to Pain Management   2  Type 2 diabetes mellitus without complication, without long-term current use of insulin (HCC)  Hemoglobin A1C   3  Class 3 severe obesity due to excess calories with serious comorbidity and body mass index (BMI) of 50 0 to 59 9 in adult [facility])  Ambulatory referral to Weight Management   4  Vitamin D deficiency  Vitamin D 25 hydroxy    ergocalciferol (VITAMIN D2) 50,000 units   5  Essential hypertension  CBC    Comprehensive metabolic panel    TSH, 3rd generation   6  LINA (obstructive sleep apnea)     7  Mixed hyperlipidemia  Lipid panel   8  Elevated C-reactive protein (CRP)  C-reactive protein   9  Elevated LFTs     10  Hiatal hernia     11  Leukocytosis, unspecified type             Chief Complaint     Chief Complaint   Patient presents with    Follow-up     discuss blood work        History of Present Illness     Izaiah Person is a 35year old male presenting today for follow up on chronic conditions  Back is still painful     Physical therapy helped for short time, but back is flaring up again   Uses ibuprofen muscle relaxer when pain is really bad, otherwise tries to push through  Pain is located mid low back, worse on the left  No pain radiating down lower extremities  No numbness, tingling, weakness  Lost 7 lb  Since last office visit  He has a new position at work, 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  He is now walking more and walking at least 10,000 steps per day  He has been eating less junk food, cut out white bread, reducing carbohydrates  Review of Systems   Review of Systems   Constitutional: Negative  HENT: Negative  Respiratory: Negative  Cardiovascular: Negative  Gastrointestinal: Negative  Genitourinary: Negative  Musculoskeletal: Negative  Skin: Negative  Neurological: Negative  Hematological: Negative  Psychiatric/Behavioral: Negative          Active Problem List     Patient Active Problem List   Diagnosis    Class 3 severe obesity due to excess calories with body mass index (BMI) of 50 0 to 59 9 in adult [facility])    Hearing loss in left ear    Tobacco abuse    Type 2 diabetes mellitus without complication, without long-term current use of insulin (HCC)    Essential hypertension    LINA (obstructive sleep apnea)    Vitamin D deficiency    Snoring    Witnessed episode of apnea    RLS (restless legs syndrome)    PLMD (periodic limb movement disorder)    Hepatomegaly    Leukocytosis    Elevated LFTs    Hiatal hernia    Mixed hyperlipidemia       Past Medical History:  Past Medical History:   Diagnosis Date    Allergies     Seasonal    Blood in stool     Cough with sputum     Diabetes mellitus ([address] )     Hypertension     Pancreatitis     Problems with hearing     Wheezing        Past Surgical History:  Past Surgical History:   Procedure Laterality Date    [facility]    MOUTH SURGERY         Family History:  Family History   Problem Relation Age of Onset    Hypertension Father    [facility] Diabetes Father     Meniere's disease Maternal Grandfather        Social History:  Social History     Socioeconomic History    Marital status: Single     Spouse name: Not on file    Number of children: Not on file    Years of education: Not on file    Highest education level: Not on file   Occupational History    Not on file   Social Needs    Financial resource strain: Not on file    Food insecurity     Worry: Not on file     Inability: Not on file   [employer] needs     Medical: Not on file     Non-medical: Not on file   Tobacco Use    Smoking status: Light Tobacco Smoker     Packs/day: 0 25    Smokeless tobacco: Current User     Types: Chew   Substance and Sexual Activity    Alcohol use: Yes     Frequency: 2-3 times a week     Drinks per session: 10 or more     Binge frequency: Weekly     Comment: about twice a month    Drug use: Never    Sexual activity: Not on file   Lifestyle    Physical activity     Days per week: Not on file     Minutes per session: Not on file    Stress: Not on file   Relationships    Social connections     Talks on phone: Not on file     Gets together: Not on file     Attends Confucianism service: Not on file     Active member of club or organization: Not on file     Attends meetings of clubs or organizations: Not on file     Relationship status: Not on file    Intimate partner violence     Fear of current or ex partner: Not on file     Emotionally abused: Not on file     Physically abused: Not on file     Forced sexual activity: Not on file   Other Topics Concern    Not on file   Social History Narrative    Not on file       I have reviewed the patient's medical history in detail; there are no changes to the history as noted in the electronic medical record      Objective     Vitals:    02/17/21 0849 02/17/21 0931   BP: 138/82 130/78   BP Location: Left arm    Patient Position: Sitting    Cuff Size: Adult    Pulse: 93    Resp: 16    Temp: 98 1 °F (36 7 °C)    TempSrc: Temporal    SpO2: 98%    Weight: (!) 171 kg (377 lb 9 6 oz)    Height: 5' 11" (1 803 m)      Wt Readings from Last 3 Encounters:   02/17/21 (!) 171 kg (377 lb 9 6 oz)   01/06/21 (!) 174 kg (383 lb 12 8 oz)   11/24/20 (!) 174 kg (384 lb 6 oz)     Physical Exam  Vitals signs and nursing note reviewed  Constitutional:       General: He is not in acute distress  Appearance: Normal appearance  He is not ill-appearing, toxic-appearing or diaphoretic  Cardiovascular:      Rate and Rhythm: Normal rate and regular rhythm  Heart sounds: No murmur  Pulmonary:      Effort: Pulmonary effort is normal       Breath sounds: Normal breath sounds  Musculoskeletal:      Right lower leg: No edema  Left lower leg: No edema  Skin:     General: Skin is warm and dry  Findings: No rash  Neurological:      Mental Status: He is alert     Psychiatric:         Mood and Affect: Mood normal          Behavior: Behavior normal             ALLERGIES:  Allergies   Allergen Reactions    Morphine Anaphylaxis     Anaphylaxis    Guaifenesin Hives    Losartan Abdominal Pain     Muscle cramps    Penicillins Rash    Sulfa Antibiotics Rash       Current Medications     Current Outpatient Medications   Medication Sig Dispense Refill    amLODIPine (NORVASC) 10 mg tablet TAKE 1 TABLET BY MOUTH EVERY DAY 90 tablet 0    fexofenadine (ALLEGRA) 180 MG tablet Take 180 mg by mouth daily       fluticasone (FLONASE) 50 mcg/act nasal spray 2 sprays into each nostril      lisinopril (ZESTRIL) 10 mg tablet Take 1 tablet (10 mg total) by mouth daily 30 tablet 5    metFORMIN (GLUCOPHAGE) 500 mg tablet TAKE 2 TABLETS BY MOUTH TWICE A DAY WITH FOOD 360 tablet 1    methocarbamol (ROBAXIN) 750 mg tablet Take 1 tablet (750 mg total) by mouth every 6 (six) hours as needed for muscle spasms 20 tablet 0    omeprazole (PriLOSEC) 40 MG capsule TAKE 1 CAPSULE BY MOUTH EVERY DAY 30 capsule 5    EPINEPHrine (EPIPEN) 0 3 mg/0 3 mL SOAJ Inject 0 3 mL (0 3 mg total) into a muscle once for 1 dose (Patient not taking: Reported on 2/17/2021) 0 6 mL 3    ergocalciferol (VITAMIN D2) 50,000 units Take 1 capsule (50,000 Units total) by mouth once a week 4 capsule 5    ibuprofen (MOTRIN) 600 mg tablet Take 1 tablet (600 mg total) by mouth every 6 (six) hours as needed for mild pain for up to 7 days (Patient not taking: Reported on 2/17/2021) 28 tablet 0     No current facility-administered medications for this visit            Health Maintenance     Health Maintenance   Topic Date Due    Pneumococcal Vaccine: Pediatrics (0 to 5 Years) and At-Risk Patients (6 to 59 Years) (1 of 1 - PPSV23) 12/24/1997    DM Eye Exam  12/24/2001    HIV Screening  12/24/2006    DTaP,Tdap,and Td Vaccines (1 - Tdap) 12/24/2012    BMI: Followup Plan  01/13/2021    Influenza Vaccine (1) 06/30/2021 (Originally 9/1/2020)    HEMOGLOBIN A1C  07/29/2021    Depression Screening PHQ  09/08/2021    Annual Physical  09/08/2021    Diabetic Foot Exam  09/08/2021    BMI: Adult  02/17/2022    HIB Vaccine  Aged Out    Hepatitis B Vaccine  Aged Out    IPV Vaccine  Aged Out    Hepatitis A Vaccine  Aged Out    Meningococcal ACWY Vaccine  Aged Out    HPV Vaccine  Aged [employer] History   Administered Date(s) Administered    INFLUENZA 02/04/2013       TONIE Roberson

## 2021-02-20 PROBLEM — K44.9 HIATAL HERNIA: Status: ACTIVE | Noted: 2021-02-20

## 2021-02-20 PROBLEM — E78.2 MIXED HYPERLIPIDEMIA: Status: ACTIVE | Noted: 2021-02-20

## 2021-02-20 PROBLEM — R79.89 ELEVATED LFTS: Status: ACTIVE | Noted: 2021-02-20

## 2021-02-21 NOTE — ASSESSMENT & PLAN NOTE
Blood pressure is borderline 130/78  Blood pressure goal less than 130/80  Will continue amlodipine and lisinopril  He is working on [employer], weight loss  I have referred him to weight management

## 2021-02-21 NOTE — ASSESSMENT & PLAN NOTE
Using CPAP sporadically  Works night shift, 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  Sometimes falls asleep on couch  Aware he needs to use CPAP more regularly

## 2021-02-21 NOTE — ASSESSMENT & PLAN NOTE
Held if hernia noted on recent EGD  Currently taking omeprazole 40 mg daily  This helps significantly with GERD symptoms

## 2021-02-21 NOTE — ASSESSMENT & PLAN NOTE
BMI Counseling: Body mass index is 52 66 kg/m²  The BMI is above normal  Patient referred to weight management due to patient being morbidly obese

## 2021-02-21 NOTE — ASSESSMENT & PLAN NOTE
Lab Results   Component Value Date    HGBA1C 7 4 (H) 01/29/2021     A1c has improved from 9 4% to 7 4% with lifestyle modifications  Continue Metformin  Continue lifestyle modifications  Continue to work on weight loss  Foot exam is up-to-date  Aware he is due for eye exam   He will return to the office in 3-4 months for checkup

## 2021-03-01 NOTE — PROGRESS NOTES
Cardiology Follow Up    Alli Holland  1990  [id number]  [facility] [place]  [address] [place]  [address]  [phone]  [phone]    1  Essential (primary) hypertension     2  Abnormal EKG         Interval History:  Patient is here for a follow-up visit  He was last seen by me in March 2019  Since that time he had an echocardiogram March 2019 which demonstrated a hyperdynamic left ventricle with an ejection fraction of 70%  LV wall thickness was upper limits of normal   Left atrial cavity size is well was upper limits of normal   There was no significant valvular heart disease  Patient is on amlodipine in reference to hypertension  He has diabetes mellitus  He has stopped drinking sugar containing sodas  He has lost weight  Lipid profile done January 2021 demonstrated total cholesterol of 204 with an HDL of 31 and a calculated LDL of 141  This increase compared to a prior profile done November 2020  Patient does have diabetes mellitus  He has had no chest pain or significant dyspnea  His vital signs are stable today      Patient Active Problem List   Diagnosis    Class 3 severe obesity due to excess calories with body mass index (BMI) of 50 0 to 59 9 in adult [facility])    Hearing loss in left ear    Tobacco abuse    Type 2 diabetes mellitus without complication, without long-term current use of insulin (HCC)    Essential hypertension    LINA (obstructive sleep apnea)    Vitamin D deficiency    Snoring    Witnessed episode of apnea    RLS (restless legs syndrome)    PLMD (periodic limb movement disorder)    Hepatomegaly    Leukocytosis    Elevated LFTs    Hiatal hernia    Mixed hyperlipidemia     Past Medical History:   Diagnosis Date    Allergies     Seasonal    Blood in stool     Cough with sputum     Diabetes mellitus ([address] )     Hypertension     Pancreatitis     Problems with hearing     Wheezing      Social History     Socioeconomic History    Marital status: Single     Spouse name: Not on file    Number of children: Not on file    Years of education: Not on file    Highest education level: Not on file   Occupational History    Not on file   Social Needs    Financial resource strain: Not on file    Food insecurity     Worry: Not on file     Inability: Not on file    Transportation needs     Medical: Not on file     Non-medical: Not on file   Tobacco Use    Smoking status: Light Tobacco Smoker     Packs/day: 0 25    Smokeless tobacco: Current User     Types: Chew   Substance and Sexual Activity    Alcohol use: Yes     Frequency: 2-3 times a week     Drinks per session: 10 or more     Binge frequency: Weekly     Comment: about twice a month    Drug use: Never    Sexual activity: Not on file   Lifestyle    Physical activity     Days per week: Not on file     Minutes per session: Not on file    Stress: Not on file   Relationships    Social connections     Talks on phone: Not on file     Gets together: Not on file     Attends Jehovah's witness service: Not on file     Active member of club or organization: Not on file     Attends meetings of clubs or organizations: Not on file     Relationship status: Not on file    Intimate partner violence     Fear of current or ex partner: Not on file     Emotionally abused: Not on file     Physically abused: Not on file     Forced sexual activity: Not on file   Other Topics Concern    Not on file   Social History Narrative    Not on file      Family History   Problem Relation Age of Onset    Hypertension Father     Diabetes Father     Meniere's disease Maternal Grandfather      Past Surgical History:   Procedure Laterality Date    COLONOSCOPY  2008    [facility]    MOUTH SURGERY         Current Outpatient Medications:     amLODIPine (NORVASC) 10 mg tablet, TAKE 1 TABLET BY MOUTH EVERY DAY, Disp: 90 tablet, Rfl: 0    ergocalciferol (VITAMIN D2) 50,000 units, Take 1 capsule (50,000 Units total) by mouth once a week, Disp: 4 capsule, Rfl: 5    fexofenadine (ALLEGRA) 180 MG tablet, Take 180 mg by mouth daily , Disp: , Rfl:     fluticasone (FLONASE) 50 mcg/act nasal spray, 2 sprays into each nostril, Disp: , Rfl:     lisinopril (ZESTRIL) 10 mg tablet, Take 1 tablet (10 mg total) by mouth daily, Disp: 30 tablet, Rfl: 5    metFORMIN (GLUCOPHAGE) 500 mg tablet, TAKE 2 TABLETS BY MOUTH TWICE A DAY WITH FOOD, Disp: 360 tablet, Rfl: 1    methocarbamol (ROBAXIN) 750 mg tablet, Take 1 tablet (750 mg total) by mouth every 6 (six) hours as needed for muscle spasms, Disp: 20 tablet, Rfl: 0    omeprazole (PriLOSEC) 40 MG capsule, TAKE 1 CAPSULE BY MOUTH EVERY DAY, Disp: 30 capsule, Rfl: 5    EPINEPHrine (EPIPEN) 0 3 mg/0 3 mL SOAJ, Inject 0 3 mL (0 3 mg total) into a muscle once for 1 dose (Patient not taking: Reported on 2/17/2021), Disp: 0 6 mL, Rfl: 3  Allergies   Allergen Reactions    Morphine Anaphylaxis     Anaphylaxis    Guaifenesin Hives    Losartan Abdominal Pain     Muscle cramps    Penicillins Rash    Sulfa Antibiotics Rash       Labs:not applicable  Imaging: No results found  Review of Systems:  Review of Systems   All other systems reviewed and are negative  Physical Exam:  /66 (BP Location: Right arm, Patient Position: Sitting, Cuff Size: Large)   Pulse 84   Ht 5' 11" (1 803 m)   Wt (!) 171 kg (377 lb)   BMI 52 58 kg/m²   Physical Exam  Vitals signs reviewed  Constitutional:       Appearance: He is well-developed  HENT:      Head: Normocephalic and atraumatic  Eyes:      Conjunctiva/sclera: Conjunctivae normal       Pupils: Pupils are equal, round, and reactive to light  Neck:      Musculoskeletal: Normal range of motion and neck supple  Cardiovascular:      Rate and Rhythm: Normal rate  Heart sounds: Normal heart sounds  Pulmonary:      Effort: Pulmonary effort is normal       Breath sounds: Normal breath sounds  Skin:     General: Skin is warm and dry  Neurological:      Mental Status: He is alert and oriented to person, place, and time  Discussion/Summary:I will continue the patient's present medical regimen  Patient appears well compensated  I have asked the patient to call if there is a problem in the interim otherwise I will see the patient in one years time

## 2021-03-09 ENCOUNTER — TELEPHONE (OUTPATIENT)
Dept: HEMATOLOGY ONCOLOGY | Facility: CLINIC | Age: 31
End: 2021-03-09

## 2021-03-10 ENCOUNTER — OFFICE VISIT (OUTPATIENT)
Dept: CARDIOLOGY CLINIC | Facility: CLINIC | Age: 31
End: 2021-03-10
Payer: COMMERCIAL

## 2021-03-10 ENCOUNTER — OFFICE VISIT (OUTPATIENT)
Dept: HEMATOLOGY ONCOLOGY | Facility: CLINIC | Age: 31
End: 2021-03-10
Payer: COMMERCIAL

## 2021-03-10 VITALS
OXYGEN SATURATION: 99 % | SYSTOLIC BLOOD PRESSURE: 131 MMHG | WEIGHT: 315 LBS | HEART RATE: 84 BPM | HEIGHT: 71 IN | TEMPERATURE: 97.8 F | BODY MASS INDEX: 44.1 KG/M2 | DIASTOLIC BLOOD PRESSURE: 81 MMHG | RESPIRATION RATE: 17 BRPM

## 2021-03-10 VITALS
SYSTOLIC BLOOD PRESSURE: 132 MMHG | DIASTOLIC BLOOD PRESSURE: 66 MMHG | BODY MASS INDEX: 44.1 KG/M2 | HEIGHT: 71 IN | HEART RATE: 84 BPM | WEIGHT: 315 LBS

## 2021-03-10 DIAGNOSIS — I10 ESSENTIAL (PRIMARY) HYPERTENSION: Primary | ICD-10-CM

## 2021-03-10 DIAGNOSIS — D72.829 LEUKOCYTOSIS, UNSPECIFIED TYPE: Primary | ICD-10-CM

## 2021-03-10 DIAGNOSIS — R94.31 ABNORMAL EKG: ICD-10-CM

## 2021-03-10 PROCEDURE — 99213 OFFICE O/P EST LOW 20 MIN: CPT | Performed by: PHYSICIAN ASSISTANT

## 2021-03-10 PROCEDURE — 99214 OFFICE O/P EST MOD 30 MIN: CPT | Performed by: INTERNAL MEDICINE

## 2021-03-10 NOTE — PROGRESS NOTES
Hematology/Oncology Outpatient Follow- up Note  Bob Dodd 35 y o  male MRN: @ Encounter: [id number]        Date:  3/10/2021      Assessment / Plan:    1  Mild leukocytosis [id number] with normal differential, hemoglobin and platelets since at least August 2016    2  Multiple chronic conditions including obesity, hypertension, diabetes, tobacco use, sleep apnea, enlarged fatty liver, reported history of ulcerative colitis, back pain      3   Elevated CRP 29              HPI:  Bob Dodd is a 35 y o  seen for initial consultation 1/6/2021 at the referral of Karen Wright accompanied by his Elizabeth Saavedra regarding leukocytosis       11/24/2020 hemoglobin 14 6, MCV of 86, white blood cell count 12 75, 60% neutrophils, 23% lymphocytes, 5% monocytes, platelet count 816  Normal hepatitis panel, ceruloplasmin, celiac panel, NILAY, TSH, mitochondrial AB, alpha-1 antitrypsin      10/2020:  Iron saturation 23%, ferritin 295      1/13/2020 hemoglobin 14, MCV of 89, white blood cell count 11 23, normal differential, platelets 575     August 2016 hemoglobin 14 1, white blood cell count 11 4, 64% neutrophils, 24% lymphocytes, 6% monocytes, platelet count 625     RUQ u/s:  Hepatomegaly 19 6cm with fatty infiltration           EGD performed 12/24/2020 due to GERD  Hiatal hernia noted  There was localized nodular mucosa with erosion in the antrum ongoing use of omeprazole was recommended      Patient does have diabetes, hypertension, sleep apnea, tobacco use, obesity  Forty drinks alcohol socially approximately once per week      He has back pain off and on since helping his mother-in-law a move in August 2020  He has undergone physical therapy        Denies any dental issues  Denies any fevers, chills, sweats        Does work as a   Interval History:    1/29/21:   Hemoglobin 14 8, MCV of 89, white blood cell count 12 77, 66% neutrophils, 24% lymphocytes, 5% monocytes, platelets 123  Glucose 132, AST 55,    hemoglobin A1c is improved from 9 point 11/5/2022 7 4  Folate normal       Test Results:        Labs:   Lab Results   Component Value Date    HGB 14 8 01/29/2021    HCT 45 1 01/29/2021    MCV 89 01/29/2021     01/29/2021    WBC 12 77 (H) 01/29/2021    NRBC 0 01/29/2021     Lab Results   Component Value Date    K 3 8 01/29/2021     01/29/2021    CO2 29 01/29/2021    BUN 12 01/29/2021    CREATININE 0 96 01/29/2021    GLUF 132 (H) 01/29/2021    CALCIUM 9 6 01/29/2021    AST 55 (H) 01/29/2021     (H) 01/29/2021    ALKPHOS 107 01/29/2021    EGFR 106 01/29/2021       Imaging: No results found  ROS:  As mentioned in HPI & Interval History otherwise 14 point ROS negative  Allergies: Allergies   Allergen Reactions    Morphine Anaphylaxis     Anaphylaxis    Guaifenesin Hives    Losartan Abdominal Pain     Muscle cramps    Penicillins Rash    Sulfa Antibiotics Rash     Current Medications: Reviewed  PMH/FH/SH:  Reviewed      Physical Exam:    2 76 meters squared    Ht Readings from Last 3 Encounters:   03/10/21 5' 11" (1 803 m)   02/17/21 5' 11" (1 803 m)   01/06/21 5' 11" (1 803 m)        Wt Readings from Last 3 Encounters:   03/10/21 (!) 171 kg (376 lb 3 2 oz)   02/17/21 (!) 171 kg (377 lb 9 6 oz)   01/06/21 (!) 174 kg (383 lb 12 8 oz)        Temp Readings from Last 3 Encounters:   03/10/21 97 8 °F (36 6 °C)   02/17/21 98 1 °F (36 7 °C) (Temporal)   01/06/21 (!) 97 3 °F (36 3 °C)        BP Readings from Last 3 Encounters:   03/10/21 131/81   02/17/21 130/78   01/06/21 130/71           Physical Exam  Vitals signs reviewed  Constitutional:       General: He is not in acute distress  Appearance: He is well-developed  He is not diaphoretic  HENT:      Head: Normocephalic and atraumatic  Eyes:      Conjunctiva/sclera: Conjunctivae normal    Neck:      Musculoskeletal: Normal range of motion and neck supple  Trachea: No tracheal deviation  Cardiovascular:      Rate and Rhythm: Normal rate and regular rhythm  Heart sounds: No murmur  No friction rub  No gallop  Pulmonary:      Effort: Pulmonary effort is normal  No respiratory distress  Breath sounds: Normal breath sounds  No wheezing or rales  Chest:      Chest wall: No tenderness  Abdominal:      General: There is no distension  Palpations: Abdomen is soft  Tenderness: There is no abdominal tenderness  Lymphadenopathy:      Cervical: No cervical adenopathy  Skin:     General: Skin is warm and dry  Coloration: Skin is not pale  Findings: No erythema  Neurological:      Mental Status: He is alert and oriented to person, place, and time  Psychiatric:         Behavior: Behavior normal          Thought Content: Thought content normal          Judgment: Judgment normal          ECOG:       Emergency Contacts:     Loli Fuentes, [phone],

## 2021-03-19 ENCOUNTER — CONSULT (OUTPATIENT)
Dept: PAIN MEDICINE | Facility: CLINIC | Age: 31
End: 2021-03-19
Payer: COMMERCIAL

## 2021-03-19 VITALS
HEART RATE: 91 BPM | SYSTOLIC BLOOD PRESSURE: 131 MMHG | BODY MASS INDEX: 44.1 KG/M2 | DIASTOLIC BLOOD PRESSURE: 86 MMHG | HEIGHT: 71 IN | TEMPERATURE: 98.4 F | WEIGHT: 315 LBS

## 2021-03-19 DIAGNOSIS — M46.1 SACROILIITIS (HCC): ICD-10-CM

## 2021-03-19 DIAGNOSIS — M79.18 MYOFASCIAL PAIN: ICD-10-CM

## 2021-03-19 DIAGNOSIS — M54.40 LOW BACK PAIN WITH SCIATICA, SCIATICA LATERALITY UNSPECIFIED, UNSPECIFIED BACK PAIN LATERALITY, UNSPECIFIED CHRONICITY: Primary | ICD-10-CM

## 2021-03-19 PROCEDURE — 99204 OFFICE O/P NEW MOD 45 MIN: CPT | Performed by: ANESTHESIOLOGY

## 2021-03-19 PROCEDURE — 3079F DIAST BP 80-89 MM HG: CPT | Performed by: ANESTHESIOLOGY

## 2021-03-19 PROCEDURE — 3008F BODY MASS INDEX DOCD: CPT | Performed by: ANESTHESIOLOGY

## 2021-03-19 PROCEDURE — 3075F SYST BP GE 130 - 139MM HG: CPT | Performed by: ANESTHESIOLOGY

## 2021-03-19 PROCEDURE — 4004F PT TOBACCO SCREEN RCVD TLK: CPT | Performed by: ANESTHESIOLOGY

## 2021-03-19 RX ORDER — TIZANIDINE 2 MG/1
2 TABLET ORAL 2 TIMES DAILY PRN
Qty: 90 TABLET | Refills: 1 | Status: SHIPPED | OUTPATIENT
Start: 2021-03-19

## 2021-03-19 NOTE — PROGRESS NOTES
Assessment  1  Low back pain with sciatica, sciatica laterality unspecified, unspecified back pain laterality, unspecified chronicity    2  Myofascial pain    3  Sacroiliitis [facility])          Plan  62-year-old male with a history of diabetes, LINA, ulcerative colitis, and hypertension, referred by JUDITH Paulson, presenting for initial consultation regarding a 6 month history of lumbosacral back pain more so on the right than left with subjective weakness into bilateral lower extremities worse on the right than left  He denies any specific trauma or inciting event  X-ray of the lumbar spine was unremarkable with the exception of some transitional anatomy with a partially lumbarized S1 segment  The patient has done approximately 3 weeks of physical therapy from late October to November which was interrupted secondary to COVID infection  He did find some transient relief with this  He does take Tylenol and occasionally ibuprofen p r n  with mild relief  He was prescribed Robaxin which was initially effective and now ineffective  Patient's low back pain does have a myofascial component  There is also appears to be a component of sacroiliitis on the right  His lower extremity symptoms may be radicular in nature verses peripheral neuropathy versus muscular  1  I will refer the patient back to physical therapy to complete his physical therapy program   If the patient fails 3 more weeks of physical therapy will order MRI of the lumbar spine without contrast  2  The patient did inquire about medical marijuana and a list of medical marijuana providers were provided at today's office visit for evaluation  3  I will prescribe a trial of tizanidine 2 mg q 12 hours p r n  and methocarbamol will be discontinued secondary to ineffectiveness  The patient was advised to not operate any heavy machinery while under the influence of this medication   4   I will follow up the patient in 3-4 weeks       My impressions and treatment recommendations were discussed in detail with the patient who verbalized understanding and had no further questions  Discharge instructions were provided  I personally saw and examined the patient and I agree with the above discussed plan of care  No orders of the defined types were placed in this encounter  No orders of the defined types were placed in this encounter  History of Present Illness    Margie Gonzalez is a 35 y o  male with a history of diabetes, LINA, ulcerative colitis, and hypertension, referred by A  Perez [address], presenting for initial consultation regarding a 6 month history of lumbosacral back pain more so on the right than left with subjective weakness into bilateral lower extremities worse on the right than left  He denies any bladder or bowel incontinence or saddle anesthesia  He denies any numbness or paresthesias in the legs  He denies any specific trauma or inciting event  X-ray of the lumbar spine was unremarkable with the exception of some transitional anatomy with a partially lumbarized S1 segment  The patient has done approximately 3 weeks of physical therapy from late October to November which was interrupted secondary to COVID infection  He did find some transient relief with this  He does take Tylenol and occasionally ibuprofen p r n  with mild relief  He was prescribed Robaxin which was initially effective and now ineffective  The patient rates his pain a 2/10 on the pain is nearly constant  The pain does not follow any particular pattern throughout the day  The pain is described as cramping, shooting, and pressure like  The pain is decreased with sitting and relaxation  The pain is increased with bending, walking, coughing, and sneezing  Other than as stated above, the patient denies any interval changes in medications, medical condition, mental condition, symptoms, or allergies since the last office visit            I have personally reviewed and/or updated the patient's past medical history, past surgical history, family history, social history, current medications, allergies, and vital signs today       Review of Systems    Patient Active Problem List   Diagnosis    Class 3 severe obesity due to excess calories with body mass index (BMI) of 50 0 to 59 9 in adult [facility])    Hearing loss in left ear    Tobacco abuse    Type 2 diabetes mellitus without complication, without long-term current use of insulin (HCC)    Essential hypertension    LINA (obstructive sleep apnea)    Vitamin D deficiency    Snoring    Witnessed episode of apnea    RLS (restless legs syndrome)    PLMD (periodic limb movement disorder)    Hepatomegaly    Leukocytosis    Elevated LFTs    Hiatal hernia    Mixed hyperlipidemia       Past Medical History:   Diagnosis Date    Allergies     Seasonal    Blood in stool     Cough with sputum     Diabetes mellitus ([address] )     Hypertension     Pancreatitis     Problems with hearing     Wheezing        Past Surgical History:   Procedure Laterality Date    COLONOSCOPY  2008    [facility]    MOUTH SURGERY         Family History   Problem Relation Age of Onset    Hypertension Father     Diabetes Father     Meniere's disease Maternal Grandfather        Social History     Occupational History    Not on file   Tobacco Use    Smoking status: Light Tobacco Smoker     Packs/day: 0 25    Smokeless tobacco: Current User     Types: Chew   Substance and Sexual Activity    Alcohol use: Yes     Frequency: 2-3 times a week     Drinks per session: 10 or more     Binge frequency: Weekly     Comment: about twice a month    Drug use: Never    Sexual activity: Not on file       Current Outpatient Medications on File Prior to Visit   Medication Sig    amLODIPine (NORVASC) 10 mg tablet TAKE 1 TABLET BY MOUTH EVERY DAY    EPINEPHrine (EPIPEN) 0 3 mg/0 3 mL SOAJ Inject 0 3 mL (0 3 mg total) into a muscle once for 1 dose (Patient not taking: Reported on 2/17/2021)    ergocalciferol (VITAMIN D2) 50,000 units Take 1 capsule (50,000 Units total) by mouth once a week    fexofenadine (ALLEGRA) 180 MG tablet Take 180 mg by mouth daily     fluticasone (FLONASE) 50 mcg/act nasal spray 2 sprays into each nostril    lisinopril (ZESTRIL) 10 mg tablet Take 1 tablet (10 mg total) by mouth daily    metFORMIN (GLUCOPHAGE) 500 mg tablet TAKE 2 TABLETS BY MOUTH TWICE A DAY WITH FOOD    methocarbamol (ROBAXIN) 750 mg tablet Take 1 tablet (750 mg total) by mouth every 6 (six) hours as needed for muscle spasms    omeprazole (PriLOSEC) 40 MG capsule TAKE 1 CAPSULE BY MOUTH EVERY DAY     No current facility-administered medications on file prior to visit  Allergies   Allergen Reactions    Morphine Anaphylaxis     Anaphylaxis    Guaifenesin Hives    Losartan Abdominal Pain     Muscle cramps    Penicillins Rash    Sulfa Antibiotics Rash       Physical Exam    Ht 5' 11" (1 803 m)   Wt (!) 173 kg (381 lb)   BMI 53 14 kg/m²     Constitutional: obese  Eyes: anicteric  HEENT: grossly intact  Neck: supple, symmetric, trachea midline and no masses   Pulmonary:even and unlabored  Cardiovascular:No edema or pitting edema present  Skin:Normal without rashes or lesions and well hydrated  Psychiatric:Mood and affect appropriate  Neurologic:Cranial Nerves II-XII grossly intact  Musculoskeletal:normal  Gait  Bilateral lumbar paraspinals tender to palpation  Right SI joint mildly tender to palpation  Bilateral lower extremity strength 5/5 in all muscle groups  Sensation intact to light touch in L3 through S1 dermatomes bilaterally  Bilateral patellar and Achilles reflexes were 1/4 and symmetrical   No clonus was noted bilaterally  Straight leg raise negative bilaterally  Positive Adam's and Gaenslen's test on the right and negative on the left      Imaging        PACS Images     Show images for XR spine lumbar minimum 4 views non injury   Study Result    LUMBAR SPINE     INDICATION:   [id number]: Lumbago with sciatica, left side      COMPARISON:  None     VIEWS:  XR SPINE LUMBAR MINIMUM 4 VIEWS NON INJURY  Images: 4     FINDINGS:     There are 6 non rib bearing lumbar vertebral bodies  S1 is partly lumbarized on the lateral view      There is no evidence of acute fracture or destructive osseous lesion      Alignment is unremarkable       No significant lumbar degenerative change noted      The pedicles appear intact  No pars defects are identified      Soft tissues are unremarkable      IMPRESSION:     Transitional changes at the lumbosacral level    Otherwise unremarkable study         Workstation performed: [id number]

## 2021-03-19 NOTE — PATIENT INSTRUCTIONS
Chronic Back Pain   WHAT YOU NEED TO KNOW:   What is chronic back pain? Chronic back pain is back pain that lasts 3 months or longer  This may include pain that has not been controlled or does not improve with treatment  Your back pain may cause weakness or pain that spreads to your arms or legs  What causes or increases my risk for chronic back pain? Conditions that affect the spine, joints, or muscles can cause back pain  These may include arthritis, spinal stenosis (narrowing of the spinal column), muscle tension, or breakdown of the spinal discs  The following increase your risk for back pain:  · Aging    · Lack of regular physical activity     · Repeated bending, lifting, or twisting, or lifting heavy items    · Obesity or pregnancy     · Injury from a fall or accident    · Driving, sitting, or standing for long periods    · Bad posture while sitting or standing    How is chronic back pain diagnosed? Your healthcare provider will ask if you have any medical conditions  He or she may ask if you have a history of back pain and how it started  He or she may watch you stand and walk, and check your range of motion  Show him or her where you feel pain and what makes it better or worse  Describe the pain, how bad it is, and how long it lasts  Tell your provider if your pain worsens at night or when you lie on your back  How is chronic back pain treated? · NSAIDs  help decrease swelling and pain or fever  This medicine is available with or without a doctor's order  NSAIDs can cause stomach bleeding or kidney problems in certain people  If you take blood thinner medicine, always ask your healthcare provider if NSAIDs are safe for you  Always read the medicine label and follow directions  · Acetaminophen  decreases pain and fever  It is available without a doctor's order  Ask how much to take and how often to take it  Follow directions   Read the labels of all other medicines you are using to see if they also contain acetaminophen, or ask your doctor or pharmacist  Acetaminophen can cause liver damage if not taken correctly  Do not use more than 4 grams (4,000 milligrams) total of acetaminophen in one day  · Prescription pain medicine  called narcotics or opioids may be given for certain types of chronic pain  Ask your healthcare provider how to take this medicine safely  · Muscle relaxers  help decrease pain and muscle spasms  · Steroids  decrease inflammation that causes pain  · Anesthetic  medicines may be injected in or around a nerve to block pain signals from the nerves  · Antidepressants  may be used to help decrease or prevent the symptoms of depression or anxiety  They are also used to treat nerve pain  How can I manage my symptoms? · Apply ice for 15 to 20 minutes every hour, or as directed  Use an ice pack, or put crushed ice in a plastic bag  Cover it with a towel before you apply it to your skin  Ice decreases pain and helps prevent tissue damage  · Apply heat for 20 to 30 minutes every 2 hours, or as directed  Heat helps decrease pain and muscle spasms  · Use massage to loosen tense muscles  Massage may relieve back pain caused by tight muscles  Regular massages may help prevent this kind of back pain  · Ask about acupuncture for pain relief  Back pain is sometimes relieved with acupuncture  Talk to your healthcare provider before you get this treatment to make sure it is safe for you  What else can I do to relieve or prevent back pain? · Manage stress  Stress can cause back pain or make it worse  Some ways to reduce stress are listening to music, meditating, or using aromatherapy  It may help to talk with a therapist about anything that is causing you stress  Your healthcare provider can give you more information  · Stay active as much as you can without causing more pain  Ask your healthcare provider what exercises are right for you   Do not sit or lie down for long periods  This could make your back pain worse  Yoga or similar gentle movements may help relieve pain and tension in your back  Go slowly and do not strain your back as you do any movement  · Be careful when you lift heavy objects  Do not lift anything heavy until your pain is gone  Never strain your back when you lift a heavy item  If possible, ask someone to help you  · Go to physical therapy as directed  A physical therapist can teach you exercises to help improve movement and strength, and to decrease pain  When should I call my doctor? · You have severe pain  · You have new numbness, tingling, or weakness, especially in your lower back, legs, arms, or genital area  · You lose control of your bladder or bowel movements  · You have a fever or sudden weight loss  · You have new or worse pain  · You have questions or concerns about your condition or care  CARE AGREEMENT:   You have the right to help plan your care  Learn about your health condition and how it may be treated  Discuss treatment options with your healthcare providers to decide what care you want to receive  You always have the right to refuse treatment  The above information is an  only  It is not intended as medical advice for individual conditions or treatments  Talk to your doctor, nurse or pharmacist before following any medical regimen to see if it is safe and effective for you  © Copyright [address] Information is for End User's use only and may not be sold, redistributed or otherwise used for commercial purposes   All illustrations and images included in CareNotes® are the copyrighted property of [employer]  or [address] [place] [address]

## 2021-03-23 ENCOUNTER — EVALUATION (OUTPATIENT)
Dept: PHYSICAL THERAPY | Facility: REHABILITATION | Age: 31
End: 2021-03-23
Payer: COMMERCIAL

## 2021-03-23 DIAGNOSIS — M54.40 LOW BACK PAIN WITH SCIATICA, SCIATICA LATERALITY UNSPECIFIED, UNSPECIFIED BACK PAIN LATERALITY, UNSPECIFIED CHRONICITY: ICD-10-CM

## 2021-03-23 PROCEDURE — 97112 NEUROMUSCULAR REEDUCATION: CPT

## 2021-03-23 PROCEDURE — 97162 PT EVAL MOD COMPLEX 30 MIN: CPT

## 2021-03-23 NOTE — PROGRESS NOTES
PT Evaluation     Today's date: 3/23/2021  Patient name: Deirdre Solitario  : 1990  MRN: [id number]  Referring provider: Chrystine Fabry, DO  Dx:   Encounter Diagnosis     ICD-10-CM    1  Low back pain with sciatica, sciatica laterality unspecified, unspecified back pain laterality, unspecified chronicity  M54 40 Ambulatory referral to Physical Therapy     PT plan of care cert/re-cert     Patient is being discharged due to attendance policy  Start Time:   Stop Time: 750  Total time in clinic (min): 45 minutes    Assessment  Assessment details: Deirdre Solitario is a pleasant 35 y o  male who presents with R sided low back pain with minimal radicular symptoms  Patient was receiving formal physical therapy in November for a similar issue, but was diagnosed with COVID-19 and was unable to finish his therapy  Reports his pain began shortly after being diagnosed with covid  Minimal numbness/tingling in his R leg  The patient's greatest concerns are the pain he is experiencing and fear of not being able to keep active  Would benefit from skilled physical therapy to improve lumbar stability and coordination to allow for improvements in pain and overall function  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, resulting in pathoanatomical symptoms of lumbar hypermobility and limiting his ability to exercise or recreation, get out of a chair, go to work, lift, squat to  objects from the floor and walk  No further referral appears necessary at this time based upon examination results  Primary Impairments:  1) Decreased core stability and control  2) Weakness of glutes    Displayed extension bias as prone press ups, decreased his pain while performing and his pain was better following cessation of the exercise      Impairments: abnormal coordination, abnormal muscle firing, abnormal movement, activity intolerance, impaired physical strength, lacks appropriate home exercise program, pain with function and poor posture     Symptom irritability: moderateUnderstanding of Dx/Px/POC: good   Prognosis: good  Prognosis details: Positive prognostic indicators include positive attitude toward recovery and good understanding of diagnosis and treatment plan options  Negative prognostic indicators include hypertension, high symptom irritability and obesity  Goals  Patient will be independent with home exercise program    Patient will be able to manage symptoms independently  Patient will decrease pain 25-50%  Patient will report ability to manage symptoms at work  Patient will demonstrate improved core control as measured by progression with lumbar stabilization exercises  Patient will demonstrate adequate hip hinge      LTG: by discharge  Patient will improve FOTO to goal  Patient will improve strength to 4/5 to improve ability to perform ADLs/IADLs  Patient will have minimal (1-2/10) pain with flexion ROM  Patient will report minimal (1-2/10) pain during work to allow him to perform work duties  Patient will report less "bad" days to improve QOL    Plan  Plan details: Prognosis above is given PT services 2x/week tapering to 1x/week over the next 2 months and home program adherence    Patient would benefit from: skilled physical therapy  Planned modality interventions: thermotherapy: hydrocollator packs  Planned therapy interventions: activity modification, joint mobilization, manual therapy, motor coordination training, neuromuscular re-education, patient education, self care, therapeutic activities, therapeutic exercise, graded activity, home exercise program, behavior modification, strengthening, ADL retraining, body mechanics training, balance/weight bearing training, flexibility and functional ROM exercises  Frequency: 2x week  Duration in visits: 12  Duration in weeks: 6  Treatment plan discussed with: patient        Subjective Evaluation    History of Present Illness  Mechanism of injury: Cathy Dasilva is a 35 y o  male presenting to physical therapy on 03/23/21 with referral from MD for right sided low back pain  Reports that his low back pain started in December when he got COVID  Reports he saw a spine doc and would like him to finish his 6 weeks of therapy  Works as a  at [employer]  Sometimes will have some numbness, tingling and weakness in his R leg  Reports pain is constant and always there  Reports its usually a dull pain, but when it gets bad feels like someone is stabbing him  Reports he can adjust his sitting posture to improve his pain  Just started taking another muscle relaxer to help with the pain  Reports bending/lifting is not too bad  Pain  Current pain rating: 3  At best pain rating: 3  At worst pain rating: 10  Location: right sided low back   Quality: sharp  Relieving factors: relaxation and medications  Aggravating factors: walking          Objective  Palpation: TTP over L4, L2 CPA and UPA, Gowers sign  Myotomes: all intact b/l  Dermatome: all intact     Reflexes: L4: absent b/l        S1: 1+ b/l         Clonus= (-)    GAIT: R foot abduction  Squat assess: knee dominant, lacks hip hinge  SLS: able to stand without pain    Lumbar  % of normal   Flex  100% P   Extn  100%   SB Left 100%   SB Right 100%   ROT Left 100%   ROT Right 100%   Repetitive testing: extension= better    Flexion= no change        MMT         AROM          PROM    Hip       L       R        L           R      L     R   Flex  3+ 4-   WFL P WFL   Extn  Abd  Add  [facility]   ER  WFL WFL            G  Max 3+ 3+       G  Med  3+ 3+       Iliop               Neuro Dynamic Testing:  Slump test: L= (+)    R= (-)       Straight leg raise:   L= (+) for nerve tension     R= (+) for nerve tension       PKB:   L= (-)   R= (-)                      Crossed Straight leg raise:   L= (-)   R= (-)            ANTT findings: (+) neural tension b/l    Pelvic Motor Control:  Multifidus Activation = Poor    SI joint:          Thigh thrust= (-)     [place] scan=  (-) ASIS height, long sit, [place]          Hip: scour= (-)      max =    faddir= (-) prone knee flexion= (-)        Segmental mobility:   LS= TTP over L4, L2    Gowers sign  Flowsheet Rows      Most Recent Value   PT/OT G-Codes   Current Score  60   Projected Score  72             Precautions: HTN, DM type II, sleep anpea    Manuals                                                                 Neuro Re-Ed             TrA contraction 10x10"            Sacrum slides 10x10"                                                                HEP education 10'            Ther Ex             PPUs 3x10                                                                                                       Ther Activity                                       Gait Training                                       Modalities

## 2021-03-25 ENCOUNTER — APPOINTMENT (OUTPATIENT)
Dept: PHYSICAL THERAPY | Facility: REHABILITATION | Age: 31
End: 2021-03-25
Payer: COMMERCIAL

## 2021-03-25 ENCOUNTER — TELEPHONE (OUTPATIENT)
Dept: PAIN MEDICINE | Facility: CLINIC | Age: 31
End: 2021-03-25

## 2021-03-25 DIAGNOSIS — M54.40 LOW BACK PAIN WITH SCIATICA, SCIATICA LATERALITY UNSPECIFIED, UNSPECIFIED BACK PAIN LATERALITY, UNSPECIFIED CHRONICITY: Primary | ICD-10-CM

## 2021-03-25 NOTE — TELEPHONE ENCOUNTER
Pt called in to request a prior auth for the MRI  Insurance company:[employer]  Member ID: [id number]  [address] RTHBZ:[phone]   Fax # prime dx [phone] Atn : Adam Silva       Please be advise thank you        Please call patient back @  [phone]

## 2021-03-29 NOTE — TELEPHONE ENCOUNTER
Per your 3/19 consult note:     I will refer the patient back to physical therapy to complete his physical therapy program   If the patient fails 3 more weeks of physical therapy will order MRI of the lumbar spine without contrast    He had PT acal on 3/23  Please advise if ordering MRI

## 2021-03-31 NOTE — TELEPHONE ENCOUNTER
Spoke with patient regarding MRI Alhaji Mcneill, he states he was told it would be approved at 100%  I advised I would submit request but PT is usually required     Patient states Physical Therapy is making pain worse and it has been worse since the start of PT

## 2021-04-19 ENCOUNTER — TELEPHONE (OUTPATIENT)
Dept: PAIN MEDICINE | Facility: CLINIC | Age: 31
End: 2021-04-19

## 2021-04-19 NOTE — TELEPHONE ENCOUNTER
Please notify the patient the MRI of his lumbar spine was normal   There are no significant disc herniations, degenerative changes, or stenosis (narrowing where nerves exist)

## 2021-04-22 DIAGNOSIS — I10 HYPERTENSION, UNSPECIFIED TYPE: ICD-10-CM

## 2021-04-22 RX ORDER — AMLODIPINE BESYLATE 10 MG/1
TABLET ORAL
Qty: 90 TABLET | Refills: 0 | Status: SHIPPED | OUTPATIENT
Start: 2021-04-22

## 2021-06-07 DIAGNOSIS — M79.18 MYOFASCIAL PAIN: ICD-10-CM

## 2021-06-07 RX ORDER — TIZANIDINE 2 MG/1
2 TABLET ORAL 2 TIMES DAILY PRN
Qty: 180 TABLET | OUTPATIENT
Start: 2021-06-07

## 2021-09-21 ENCOUNTER — TELEPHONE (OUTPATIENT)
Dept: HEMATOLOGY ONCOLOGY | Facility: CLINIC | Age: 31
End: 2021-09-21

## 2021-09-21 NOTE — TELEPHONE ENCOUNTER
I phoned the patient and left a voicemail message asking that the patient return the call to the office to reschedule his appointment with Rosa Hensley PA-C scheduled 9/22, as the patient did not have his labs completed for this visit  The [employer] number was provided

## 2022-11-02 ENCOUNTER — TELEPHONE (OUTPATIENT)
Dept: HEMATOLOGY ONCOLOGY | Facility: CLINIC | Age: 32
End: 2022-11-02

## 2022-11-02 NOTE — TELEPHONE ENCOUNTER
Appointment Cancellation Or Reschedule     Person calling In wife   If other than patient calling, are they listed on the communication consent form? yes   Provider [address]   Office Visit Date and Time 11/8 1:30PM   Office Visit Location [facility]   Did patient want to reschedule their office appointment? If so, when was it scheduled to? Yes, 11/15 3PM   Did you have STAR scheduled for this appointment? no   Do you need STAR set up for your new appointment? If yes, please send to "PATIENT RIDESHARE" pool for STAR rescheduling no   If you are cancelling appointment, can we notify STAR to cancel ride? If yes, please send to "PATIENT RIDESHARE" pool for STAR to cancel service no   Is this patient calling to reschedule an infusion appointment? no   When is their next infusion appointment? no   Is this patient a Chemo patient? no   Reason for Cancellation or Reschedule job     If the patient is a treatment patient, please route this to the office nurse  If the patient is not on treatment, please route to the office MA  If the patient is a surgical oncology patient, please route to surg/onc clinical pool

## 2022-11-02 NOTE — TELEPHONE ENCOUNTER
Scheduling Appointment SEND TO [facility]    Who Is Calling to Schedule  Spouse   Doctor Trupti Bustos   Date and Time 11/08 1:30PM   Reason for scheduling appointment MRI follow up   Patient verbalized understanding  Yes     Imaging was done at [facility]   Patient has disk and explained that they will bring it in

## 2022-11-11 ENCOUNTER — TELEPHONE (OUTPATIENT)
Dept: HEMATOLOGY ONCOLOGY | Facility: CLINIC | Age: 32
End: 2022-11-11

## 2022-11-11 DIAGNOSIS — D72.829 LEUKOCYTOSIS, UNSPECIFIED TYPE: Primary | ICD-10-CM

## 2022-11-11 NOTE — TELEPHONE ENCOUNTER
CALL TRANSFER   Reason for patient call? Kita from hospitals calling to request patients labs be faxed  Patient is at the lab now to have his labs drawn   Patient's primary physician? Dr Audery Lesches   RN call was transferred to and time it was transferred? Aida Gave @ 3:12PM   Informed patient that the message will be forwarded to the team and someone will get back to them as soon as possible    Did you relay this information to the patient?  Yes

## 2022-11-15 ENCOUNTER — TELEPHONE (OUTPATIENT)
Dept: HEMATOLOGY ONCOLOGY | Facility: CLINIC | Age: 32
End: 2022-11-15

## 2022-11-15 ENCOUNTER — APPOINTMENT (OUTPATIENT)
Dept: LAB | Facility: CLINIC | Age: 32
End: 2022-11-15

## 2022-11-15 ENCOUNTER — OFFICE VISIT (OUTPATIENT)
Dept: HEMATOLOGY ONCOLOGY | Facility: CLINIC | Age: 32
End: 2022-11-15

## 2022-11-15 VITALS
WEIGHT: 315 LBS | HEART RATE: 100 BPM | SYSTOLIC BLOOD PRESSURE: 128 MMHG | RESPIRATION RATE: 17 BRPM | BODY MASS INDEX: 44.1 KG/M2 | HEIGHT: 71 IN | DIASTOLIC BLOOD PRESSURE: 80 MMHG | OXYGEN SATURATION: 98 %

## 2022-11-15 DIAGNOSIS — D72.829 LEUKOCYTOSIS, UNSPECIFIED TYPE: ICD-10-CM

## 2022-11-15 DIAGNOSIS — R16.0 HEPATOMEGALY: ICD-10-CM

## 2022-11-15 DIAGNOSIS — R59.1 LYMPHADENOPATHY: ICD-10-CM

## 2022-11-15 DIAGNOSIS — R16.0 HEPATOMEGALY: Primary | ICD-10-CM

## 2022-11-15 LAB
ALBUMIN SERPL BCP-MCNC: 3.6 G/DL (ref 3.5–5)
ALP SERPL-CCNC: 128 U/L (ref 46–116)
ALT SERPL W P-5'-P-CCNC: 65 U/L (ref 12–78)
ANION GAP SERPL CALCULATED.3IONS-SCNC: 5 MMOL/L (ref 4–13)
AST SERPL W P-5'-P-CCNC: 19 U/L (ref 5–45)
BASOPHILS # BLD AUTO: 0.08 THOUSANDS/ÂΜL (ref 0–0.1)
BASOPHILS NFR BLD AUTO: 1 % (ref 0–1)
BILIRUB SERPL-MCNC: 0.39 MG/DL (ref 0.2–1)
BUN SERPL-MCNC: 16 MG/DL (ref 5–25)
CALCIUM SERPL-MCNC: 9.3 MG/DL (ref 8.3–10.1)
CHLORIDE SERPL-SCNC: 103 MMOL/L (ref 96–108)
CO2 SERPL-SCNC: 28 MMOL/L (ref 21–32)
CREAT SERPL-MCNC: 1.12 MG/DL (ref 0.6–1.3)
CRP SERPL QL: 20.1 MG/L
EOSINOPHIL # BLD AUTO: 0.28 THOUSAND/ÂΜL (ref 0–0.61)
EOSINOPHIL NFR BLD AUTO: 2 % (ref 0–6)
ERYTHROCYTE [DISTWIDTH] IN BLOOD BY AUTOMATED COUNT: 13 % (ref 11.6–15.1)
FERRITIN SERPL-MCNC: 167 NG/ML (ref 8–388)
GFR SERPL CREATININE-BSD FRML MDRD: 87 ML/MIN/1.73SQ M
GLUCOSE P FAST SERPL-MCNC: 248 MG/DL (ref 65–99)
HCT VFR BLD AUTO: 44.2 % (ref 36.5–49.3)
HGB BLD-MCNC: 14.7 G/DL (ref 12–17)
IMM GRANULOCYTES # BLD AUTO: 0.07 THOUSAND/UL (ref 0–0.2)
IMM GRANULOCYTES NFR BLD AUTO: 1 % (ref 0–2)
IRON SATN MFR SERPL: 16 % (ref 20–50)
IRON SERPL-MCNC: 53 UG/DL (ref 65–175)
LDH SERPL-CCNC: 197 U/L (ref 81–234)
LYMPHOCYTES # BLD AUTO: 2.8 THOUSANDS/ÂΜL (ref 0.6–4.47)
LYMPHOCYTES NFR BLD AUTO: 21 % (ref 14–44)
MCH RBC QN AUTO: 28.3 PG (ref 26.8–34.3)
MCHC RBC AUTO-ENTMCNC: 33.3 G/DL (ref 31.4–37.4)
MCV RBC AUTO: 85 FL (ref 82–98)
MONOCYTES # BLD AUTO: 0.66 THOUSAND/ÂΜL (ref 0.17–1.22)
MONOCYTES NFR BLD AUTO: 5 % (ref 4–12)
NEUTROPHILS # BLD AUTO: 9.31 THOUSANDS/ÂΜL (ref 1.85–7.62)
NEUTS SEG NFR BLD AUTO: 70 % (ref 43–75)
NRBC BLD AUTO-RTO: 0 /100 WBCS
PLATELET # BLD AUTO: 355 THOUSANDS/UL (ref 149–390)
PMV BLD AUTO: 10.2 FL (ref 8.9–12.7)
POTASSIUM SERPL-SCNC: 4.3 MMOL/L (ref 3.5–5.3)
PROT SERPL-MCNC: 7.5 G/DL (ref 6.4–8.4)
RBC # BLD AUTO: 5.19 MILLION/UL (ref 3.88–5.62)
SODIUM SERPL-SCNC: 136 MMOL/L (ref 135–147)
TIBC SERPL-MCNC: 327 UG/DL (ref 250–450)
WBC # BLD AUTO: 13.2 THOUSAND/UL (ref 4.31–10.16)

## 2022-11-15 NOTE — PROGRESS NOTES
Hematology/Oncology Outpatient Follow- up Note  Mele Ocampo 28 y o  male MRN: @ Encounter: [id number]        Date:  11/15/2022      Assessment / Plan:    1  Mild leukocytosis [id number] with normal differential, hemoglobin and platelets since at least August 2016  No evidence of anemia or abnormal platelet count  2   Multiple chronic conditions including obesity, hypertension, diabetes, tobacco use, sleep apnea, enlarged fatty liver, reported history of ulcerative colitis, back pain  3  Elevated CRP 29       4  Splenomegaly with multiple mesenteric and right lower quadrant lymph nodes noted  On CT A/P at [facility] 6/22    Follow-up CT imaging of abdomen and pelvis along with CT scan of the chest to evaluate the lymphadenopathy and hepatosplenomegaly requested  HPI:  Mele Ocampo is a 28 y o  seen for initial consultation 1/6/2021 at the referral of Viji Chow accompanied by his Ivory Medellin regarding leukocytosis  11/24/2020 hemoglobin 14 6, MCV of 86, white blood cell count 12 75, 60% neutrophils, 23% lymphocytes, 5% monocytes, platelet count 568  Normal hepatitis panel, ceruloplasmin, celiac panel, NILAY, TSH, mitochondrial AB, alpha-1 antitrypsin  10/2020:  Iron saturation 23%, ferritin 295      1/13/2020 hemoglobin 14, MCV of 89, white blood cell count 11 23, normal differential, platelets 021     August 2016 hemoglobin 14 1, white blood cell count 11 4, 64% neutrophils, 24% lymphocytes, 6% monocytes, platelet count 119     RUQ u/s:  Hepatomegaly 19 6cm with fatty infiltration  EGD performed 12/24/2020 due to GERD  Hiatal hernia noted  There was localized nodular mucosa with erosion in the antrum ongoing use of omeprazole was recommended  Patient does have diabetes, hypertension, sleep apnea, tobacco use, obesity  Reports he drinks alcohol socially approximately once per week       He has back pain off and on since helping his mother-in-law a move in August 2020  He has undergone physical therapy  Denies any dental issues  Denies any fevers, chills, sweats  Does work as a   1/29/21: Hemoglobin 14 8, MCV of 89, white blood cell count 12 77, 66% neutrophils, 24% lymphocytes, 5% monocytes, platelets 743  Glucose 132, AST 55,   hemoglobin A1c is improved from 9 point 11/5/2022 7 4  Folate normal         Interval History:     3/4/22 CT A/P LVH- thickening of the transverse portion of the colon with pericolonic inflammatory change suggesting colitis  Consider infectious and inflammatory etiologies  There is no pericolonic abscess  Liver: The liver is homogeneous in attenuation and is smooth in contour  There is no abnormal focus of enhancement at the left or the right hepatic lobe  Hepatic veins and portal veins demonstrate normal enhancement  Spleen: The spleen is homogeneous in attenuation    3/6/2022 hemoglobin 14 9, white blood cell count 13, 71% neutrophils, 19% lymphocytes, platelets 126  CRP 64, sed rate 37    3/7/22 abd u/s performed 2nd to - increased lipase, epigastric pain, abdominal pain for 9 days  The liver measures 20cm  There is increase echogenicity of the liver suggestive of underlying steatosis  There is no demonstrated mass lesion  6/1/22 noncontrast CT A/P LVH    1  There are nonobstructing calculi at the mid and the lower pole of the right kidney measuring 1 2 mm and 1 4 mm respectively  2 The enlarged liver demonstrates homogeneously low attenuation without focal mass lesion representing fatty infiltration measuring 21 7 cm in length  3 The enlarged spleen demonstrates homogeneous attenuation without focal mass lesion measuring 16 8 cm in length     4 There are multiple mesenteric and right lower quadrant lymph nodes noted with the largest right lower quadrant lymph node measuring 0 8 cm which was previously measuring 1 4 cm with broad differential diagnoses as would include   mesenteric adenitis, low-grade lymphoma, however, other etiologies would not be excluded  6/1/2022 hemoglobin 14, MCV 84, white blood cell count 12 2, 71% neutrophils, 20% lymphocytes, platelet count 407    Denies any fevers, chills, sweats  No weight change  Good appetite  No early satiety  No increased bruising or bleeding  No chest pain or shortness of breath  Test Results:        Labs:   Lab Results   Component Value Date    HGB 14 8 01/29/2021    HCT 45 1 01/29/2021    MCV 89 01/29/2021     01/29/2021    WBC 12 77 (H) 01/29/2021    NRBC 0 01/29/2021     Lab Results   Component Value Date    K 3 8 01/29/2021     01/29/2021    CO2 29 01/29/2021    BUN 12 01/29/2021    CREATININE 0 96 01/29/2021    GLUF 132 (H) 01/29/2021    CALCIUM 9 6 01/29/2021    AST 55 (H) 01/29/2021     (H) 01/29/2021    ALKPHOS 107 01/29/2021    EGFR 106 01/29/2021       Imaging: No results found  ROS:  As mentioned in HPI & Interval History otherwise 14 point ROS negative  Allergies: Allergies   Allergen Reactions   • Morphine Anaphylaxis     Anaphylaxis   • Guaifenesin Hives   • Losartan Abdominal Pain     Muscle cramps   • Penicillins Rash   • Sulfa Antibiotics Rash     Current Medications: Reviewed  PMH/FH/SH:  Reviewed      Physical Exam:    2 72 meters squared    Ht Readings from Last 3 Encounters:   11/15/22 5' 11" (1 803 m)   10/17/22 5' 11" (1 803 m)   04/13/22 5' 11" (1 803 m)        Wt Readings from Last 3 Encounters:   11/15/22 (!) 165 kg (363 lb)   10/17/22 (!) 173 kg (381 lb)   04/13/22 (!) 173 kg (381 lb)        Temp Readings from Last 3 Encounters:   05/14/21 97 5 °F (36 4 °C) (Skin)   03/19/21 98 4 °F (36 9 °C) (Oral)   03/10/21 97 8 °F (36 6 °C)        BP Readings from Last 3 Encounters:   11/15/22 128/80   03/19/21 131/86   03/10/21 132/66           Physical Exam  Constitutional:       Appearance: He is well-developed and well-nourished     HENT:      Head: Normocephalic and atraumatic  Cardiovascular:      Rate and Rhythm: Normal rate and regular rhythm  Heart sounds: Normal heart sounds  No murmur heard  Pulmonary:      Effort: Pulmonary effort is normal  No respiratory distress  Breath sounds: Normal breath sounds  No stridor  No wheezing or rhonchi  Abdominal:      Palpations: Abdomen is soft  Comments: obese   Musculoskeletal:      Cervical back: Neck supple  Lymphadenopathy:      Cervical: No cervical adenopathy  Upper Body:      Right upper body: No supraclavicular or axillary adenopathy  Left upper body: No supraclavicular or axillary adenopathy  Skin:     General: Skin is warm and dry  Neurological:      Mental Status: He is alert and oriented to person, place, and time     Psychiatric:         Mood and Affect: Mood and affect normal          Behavior: Behavior normal          ECO      Emergency Contacts:    Extended Emergency Contact Information  Primary Emergency Contact: Jonn Friar  Address: [address] [place] [address] [address]           [address] [place] [address] [address] Phone: [phone]  Mobile Phone: [phone]  Relation: Significant Other

## 2022-11-17 LAB — SCAN RESULT: NORMAL

## 2022-11-22 ENCOUNTER — HOSPITAL ENCOUNTER (OUTPATIENT)
Dept: RADIOLOGY | Age: 32
Discharge: HOME | End: 2022-11-22

## 2022-11-22 DIAGNOSIS — R59.1 LYMPHADENOPATHY: ICD-10-CM

## 2022-11-22 DIAGNOSIS — D72.829 LEUKOCYTOSIS, UNSPECIFIED TYPE: ICD-10-CM

## 2022-11-22 DIAGNOSIS — R16.0 HEPATOMEGALY: ICD-10-CM

## 2022-11-22 RX ADMIN — IOHEXOL 100 ML: 350 INJECTION, SOLUTION INTRAVENOUS at 08:25

## 2022-12-06 ENCOUNTER — OFFICE VISIT (OUTPATIENT)
Dept: HEMATOLOGY ONCOLOGY | Facility: CLINIC | Age: 32
End: 2022-12-06

## 2022-12-06 VITALS
HEIGHT: 71 IN | BODY MASS INDEX: 44.1 KG/M2 | HEART RATE: 96 BPM | WEIGHT: 315 LBS | SYSTOLIC BLOOD PRESSURE: 150 MMHG | DIASTOLIC BLOOD PRESSURE: 82 MMHG | OXYGEN SATURATION: 98 % | TEMPERATURE: 98.3 F

## 2022-12-06 DIAGNOSIS — D72.829 LEUKOCYTOSIS, UNSPECIFIED TYPE: ICD-10-CM

## 2022-12-06 DIAGNOSIS — M46.1 SACROILIITIS (HCC): Primary | ICD-10-CM

## 2022-12-06 PROBLEM — R59.1 LYMPHADENOPATHY: Status: RESOLVED | Noted: 2022-11-15 | Resolved: 2022-12-06

## 2022-12-06 NOTE — PROGRESS NOTES
Hematology Outpatient Follow - Up Note  Mele Ocampo 28 y o  male MRN: @ Encounter: [id number]        Date:  12/6/2022        Assessment/ Plan:    #1   Mild persistent leukocytosis in the range of 11,000-13,000 with normal platelets, hemoglobin, differential, flow cytometry came back normal, this is most likely secondary to ulcerative colitis and sacroiliitis    He does not smoke anymore he has however hypertension, diabetes mellitus, sleep apnea, fatty liver no evidence of lymphadenopathy or splenomegaly    Follow-up on as-needed basis        Labs and imaging studies are reviewed by ordering provider once results are available  If there are findings that need immediate attention, you will be contacted when results available  Discussing results and the implication on your healthcare is best discussed in person at your follow-up visit  HPI:    Alfonzo kumar 28 y o  seen for initial consultation 1/6/2021 at the referral of Consuelo Duggan CRNP accompanied by his justineEileen regarding leukocytosis        11/24/2020 hemoglobin 14 6, MCV of 86, white blood cell count 12 75, 60% neutrophils, 23% lymphocytes, 5% monocytes, platelet count 011  Normal hepatitis panel, ceruloplasmin, celiac panel, NILAY, TSH, mitochondrial AB, alpha-1 antitrypsin      10/2020:  Iron saturation 23%, ferritin 295      1/13/2020 hemoglobin 14, MCV of 89, white blood cell count 11 23, normal differential, platelets 336     August 2016 hemoglobin 14 1, white blood cell count 11 4, 64% neutrophils, 24% lymphocytes, 6% monocytes, platelet count 776     RUQ u/s:  Hepatomegaly 19 6cm with fatty infiltration        EGD performed 12/24/2020 due to GERD   Hiatal hernia noted  [facility] was localized nodular mucosa with erosion in the antrum ongoing use of omeprazole was recommended      Patient does have diabetes, hypertension, sleep apnea, tobacco use, obesity   Reports he drinks alcohol socially approximately once per week      He has back pain off and on since helping his mother-in-law a move in August 2020   He has undergone physical therapy  Repeat CT scan of the chest abdomen pelvis on 4/11/2022 showed no evidence of splenomegaly or lymphadenopathy, he has fatty liver, no evidence of hemochromatosis    Flow cytometry on the peripheral blood was normal    Interval History:        Previous Treatment:         Test Results:    Imaging: CT chest abdomen pelvis w contrast    Result Date: 11/27/2022  Narrative: CT CHEST, ABDOMEN AND PELVIS WITH IV CONTRAST INDICATION:   R16 0: Hepatomegaly, not elsewhere classified D72 829: Elevated white blood cell count, unspecified R59 1: Generalized enlarged lymph nodes  Mesenteric and right lower quadrant enlarged lymph nodes were identified on a CT from [facility] in July  COMPARISON:  Ultrasound, dated 10/27/2020  TECHNIQUE: CT examination of the chest, abdomen and pelvis was performed  In addition to portal venous phase postcontrast scanning through the abdomen and pelvis, delayed phase postcontrast scanning was performed through the upper abdominal viscera  Axial, sagittal, and coronal 2D reformatted images were created from the source data and submitted for interpretation  Radiation dose length product (DLP) for this visit:  3061 mGy-cm   This examination, like all CT scans performed in the [facility], was performed utilizing techniques to minimize radiation dose exposure, including the use of iterative reconstruction and automated exposure control  IV Contrast:  100 mL of iohexol (OMNIPAQUE) Enteric Contrast: Enteric contrast was administered  FINDINGS: CHEST LUNGS:  No concerning pulmonary nodules  Calcified benign granulomas are annotated on series 3  Bronchial wall thickening  Central airways are normal  PLEURA:  Unremarkable  HEART/GREAT VESSELS: Heart is unremarkable for patient's age  No thoracic aortic aneurysm  MEDIASTINUM AND HUMBLE:  Unremarkable  CHEST WALL AND LOWER NECK:  Unremarkable  ABDOMEN LIVER/BILIARY TREE:  Liver is diffusely decreased in density consistent with fatty change  No CT evidence of suspicious hepatic mass  Normal hepatic contours  No biliary dilatation  GALLBLADDER:  No calcified gallstones  No pericholecystic inflammatory change  SPLEEN:  Unremarkable  PANCREAS:  Unremarkable  ADRENAL GLANDS:  Unremarkable  KIDNEYS/URETERS:  Unremarkable  No hydronephrosis  STOMACH AND BOWEL:  Unremarkable  APPENDIX:  No findings to suggest appendicitis  ABDOMINOPELVIC CAVITY:  No ascites  No pneumoperitoneum  No lymphadenopathy  VESSELS:  Unremarkable for patient's age  PELVIS REPRODUCTIVE ORGANS:  Unremarkable for patient's age  URINARY BLADDER:  Unremarkable  ABDOMINAL WALL/INGUINAL REGIONS:  Unremarkable  OSSEOUS STRUCTURES:  No acute fracture or destructive osseous lesion  Impression: Chest: Mild bronchial wall thickening  Abdomen and pelvis: 1  Hepatic steatosis  2   No enlarged lymph nodes, as queried  Workstation performed: [id number]       Labs:   Lab Results   Component Value Date    WBC 13 20 (H) 11/15/2022    HGB 14 7 11/15/2022    HCT 44 2 11/15/2022    MCV 85 11/15/2022     11/15/2022     Lab Results   Component Value Date    K 4 3 11/15/2022     11/15/2022    CO2 28 11/15/2022    BUN 16 11/15/2022    CREATININE 1 12 11/15/2022    GLUF 248 (H) 11/15/2022    CALCIUM 9 3 11/15/2022    AST 19 11/15/2022    ALT 65 11/15/2022    ALKPHOS 128 (H) 11/15/2022    EGFR 87 11/15/2022       Lab Results   Component Value Date    IRON 53 (L) 11/15/2022    TIBC 327 11/15/2022    FERRITIN 167 11/15/2022       No results found for: [id number]      ROS: Review of Systems   Constitutional: Negative  Negative for appetite change, chills, diaphoresis, fatigue, fever and unexpected weight change  HENT:   Negative for hearing loss, lump/mass, mouth sores, nosebleeds, sore throat, trouble swallowing and voice change  Eyes: Negative  Negative for eye problems and icterus  Respiratory: Negative  Negative for chest tightness, cough, hemoptysis and shortness of breath  Cardiovascular: Negative for chest pain and leg swelling  Gastrointestinal: Negative for abdominal distention, abdominal pain, blood in stool, constipation, diarrhea and nausea  Endocrine: Negative  Genitourinary: Negative for dysuria, frequency, hematuria and pelvic pain  Musculoskeletal: Positive for arthralgias and back pain  Negative for flank pain, gait problem, myalgias and neck stiffness  Skin: Negative for itching and rash  Neurological: Negative for dizziness, gait problem, headaches, light-headedness, numbness and speech difficulty  Hematological: Negative for adenopathy  Does not bruise/bleed easily  Psychiatric/Behavioral: Negative for confusion, decreased concentration, depression and sleep disturbance  The patient is not nervous/anxious  Current Medications: Reviewed  Allergies: Reviewed  PMH/FH/SH:  Reviewed      Physical Exam:    Body surface area is 2 73 meters squared  Wt Readings from Last 3 Encounters:   22 (!) 167 kg (369 lb)   11/15/22 (!) 165 kg (363 lb)   10/17/22 (!) 173 kg (381 lb)        Temp Readings from Last 3 Encounters:   22 98 3 °F (36 8 °C) (Temporal)   21 97 5 °F (36 4 °C) (Skin)   21 98 4 °F (36 9 °C) (Oral)        BP Readings from Last 3 Encounters:   22 150/82   11/15/22 128/80   21 131/86         Pulse Readings from Last 3 Encounters:   22 96   11/15/22 100   21 91        Physical Exam  Constitutional:       Appearance: Normal appearance  He is obese  Neurological:      Mental Status: He is alert  Psychiatric:         Mood and Affect: Mood normal          Behavior: Behavior normal          Thought Content: Thought content normal          Judgment: Judgment normal          ECO    Goals and Barriers:  Current Goal: Minimize effects of disease     Barriers: None       Patient's Capacity to Self Care:  Patient is able to self care      Code Status: [email or web address] is not a good day this is not a good baby everything is being dictated in different place

## 2024-04-18 ENCOUNTER — TELEPHONE (OUTPATIENT)
Dept: PSYCHIATRY | Facility: CLINIC | Age: 34
End: 2024-04-18

## 2024-04-18 NOTE — TELEPHONE ENCOUNTER
The patient's spouse contacted the office, requesting to placed the patient on the wait list. The writer informed the spouse that he would need verbal consent from the patient in order to discuss psych services. The patient's spouse verbalized her understanding.